# Patient Record
Sex: FEMALE | Race: WHITE | Employment: FULL TIME | URBAN - METROPOLITAN AREA
[De-identification: names, ages, dates, MRNs, and addresses within clinical notes are randomized per-mention and may not be internally consistent; named-entity substitution may affect disease eponyms.]

---

## 2017-10-06 ENCOUNTER — HOSPITAL ENCOUNTER (EMERGENCY)
Age: 37
Discharge: HOME | End: 2017-10-06
Attending: EMERGENCY MEDICINE
Payer: COMMERCIAL

## 2017-10-06 VITALS
WEIGHT: 160 LBS | SYSTOLIC BLOOD PRESSURE: 123 MMHG | BODY MASS INDEX: 28.35 KG/M2 | TEMPERATURE: 98.5 F | HEIGHT: 63 IN | RESPIRATION RATE: 18 BRPM | DIASTOLIC BLOOD PRESSURE: 78 MMHG | OXYGEN SATURATION: 100 % | HEART RATE: 111 BPM

## 2017-10-06 DIAGNOSIS — G43.811 OTHER MIGRAINE WITH STATUS MIGRAINOSUS, INTRACTABLE: Primary | ICD-10-CM

## 2017-10-06 LAB
ALBUMIN SERPL-MCNC: 3.8 G/DL (ref 3.5–5)
ALBUMIN/GLOB SERPL: 0.9 {RATIO} (ref 1.2–3.5)
ALP SERPL-CCNC: 67 U/L (ref 50–136)
ALT SERPL-CCNC: 36 U/L (ref 12–65)
ANION GAP SERPL CALC-SCNC: 19 MMOL/L (ref 7–16)
AST SERPL-CCNC: 36 U/L (ref 15–37)
ATRIAL RATE: 145 BPM
BASOPHILS # BLD: 0 K/UL (ref 0–0.2)
BASOPHILS NFR BLD: 0 % (ref 0–2)
BILIRUB SERPL-MCNC: 0.6 MG/DL (ref 0.2–1.1)
BUN SERPL-MCNC: 7 MG/DL (ref 6–23)
CALCIUM SERPL-MCNC: 7.7 MG/DL (ref 8.3–10.4)
CALCULATED P AXIS, ECG09: 64 DEGREES
CALCULATED R AXIS, ECG10: 77 DEGREES
CALCULATED T AXIS, ECG11: 47 DEGREES
CHLORIDE SERPL-SCNC: 96 MMOL/L (ref 98–107)
CO2 SERPL-SCNC: 19 MMOL/L (ref 21–32)
CREAT SERPL-MCNC: 0.91 MG/DL (ref 0.6–1)
DIAGNOSIS, 93000: NORMAL
DIFFERENTIAL METHOD BLD: ABNORMAL
EOSINOPHIL # BLD: 0 K/UL (ref 0–0.8)
EOSINOPHIL NFR BLD: 0 % (ref 0.5–7.8)
ERYTHROCYTE [DISTWIDTH] IN BLOOD BY AUTOMATED COUNT: 13.2 % (ref 11.9–14.6)
GLOBULIN SER CALC-MCNC: 4.1 G/DL (ref 2.3–3.5)
GLUCOSE SERPL-MCNC: 278 MG/DL (ref 65–100)
HCG UR QL: NEGATIVE
HCT VFR BLD AUTO: 42.5 % (ref 35.8–46.3)
HGB BLD-MCNC: 15.1 G/DL (ref 11.7–15.4)
IMM GRANULOCYTES # BLD: 0 K/UL (ref 0–0.5)
IMM GRANULOCYTES NFR BLD: 0.2 % (ref 0–5)
LIPASE SERPL-CCNC: 168 U/L (ref 73–393)
LYMPHOCYTES # BLD: 2.9 K/UL (ref 0.5–4.6)
LYMPHOCYTES NFR BLD: 21 % (ref 13–44)
MCH RBC QN AUTO: 33.3 PG (ref 26.1–32.9)
MCHC RBC AUTO-ENTMCNC: 35.5 G/DL (ref 31.4–35)
MCV RBC AUTO: 93.6 FL (ref 79.6–97.8)
MONOCYTES # BLD: 0.7 K/UL (ref 0.1–1.3)
MONOCYTES NFR BLD: 5 % (ref 4–12)
NEUTS SEG # BLD: 9.9 K/UL (ref 1.7–8.2)
NEUTS SEG NFR BLD: 74 % (ref 43–78)
P-R INTERVAL, ECG05: 112 MS
PLATELET # BLD AUTO: 304 K/UL (ref 150–450)
PMV BLD AUTO: 9.7 FL (ref 10.8–14.1)
POTASSIUM SERPL-SCNC: 3.2 MMOL/L (ref 3.5–5.1)
PROT SERPL-MCNC: 7.9 G/DL (ref 6.3–8.2)
Q-T INTERVAL, ECG07: 356 MS
QRS DURATION, ECG06: 86 MS
QTC CALCULATION (BEZET), ECG08: 552 MS
RBC # BLD AUTO: 4.54 M/UL (ref 4.05–5.25)
SODIUM SERPL-SCNC: 134 MMOL/L (ref 136–145)
TROPONIN I SERPL-MCNC: <0.02 NG/ML (ref 0.02–0.05)
VENTRICULAR RATE, ECG03: 145 BPM
WBC # BLD AUTO: 13.5 K/UL (ref 4.3–11.1)

## 2017-10-06 PROCEDURE — 74011000250 HC RX REV CODE- 250: Performed by: EMERGENCY MEDICINE

## 2017-10-06 PROCEDURE — 74011250636 HC RX REV CODE- 250/636: Performed by: EMERGENCY MEDICINE

## 2017-10-06 PROCEDURE — 93005 ELECTROCARDIOGRAM TRACING: CPT | Performed by: EMERGENCY MEDICINE

## 2017-10-06 PROCEDURE — 83690 ASSAY OF LIPASE: CPT | Performed by: EMERGENCY MEDICINE

## 2017-10-06 PROCEDURE — 81025 URINE PREGNANCY TEST: CPT

## 2017-10-06 PROCEDURE — 96375 TX/PRO/DX INJ NEW DRUG ADDON: CPT | Performed by: EMERGENCY MEDICINE

## 2017-10-06 PROCEDURE — 84484 ASSAY OF TROPONIN QUANT: CPT | Performed by: EMERGENCY MEDICINE

## 2017-10-06 PROCEDURE — 96365 THER/PROPH/DIAG IV INF INIT: CPT | Performed by: EMERGENCY MEDICINE

## 2017-10-06 PROCEDURE — 81003 URINALYSIS AUTO W/O SCOPE: CPT | Performed by: EMERGENCY MEDICINE

## 2017-10-06 PROCEDURE — 80053 COMPREHEN METABOLIC PANEL: CPT | Performed by: EMERGENCY MEDICINE

## 2017-10-06 PROCEDURE — 85025 COMPLETE CBC W/AUTO DIFF WBC: CPT | Performed by: EMERGENCY MEDICINE

## 2017-10-06 PROCEDURE — 99285 EMERGENCY DEPT VISIT HI MDM: CPT | Performed by: EMERGENCY MEDICINE

## 2017-10-06 RX ORDER — DEXAMETHASONE SODIUM PHOSPHATE 100 MG/10ML
10 INJECTION INTRAMUSCULAR; INTRAVENOUS
Status: COMPLETED | OUTPATIENT
Start: 2017-10-06 | End: 2017-10-06

## 2017-10-06 RX ORDER — MAGNESIUM SULFATE HEPTAHYDRATE 40 MG/ML
2 INJECTION, SOLUTION INTRAVENOUS
Status: COMPLETED | OUTPATIENT
Start: 2017-10-06 | End: 2017-10-06

## 2017-10-06 RX ORDER — PAROXETINE 10 MG/1
5 TABLET, FILM COATED ORAL DAILY
COMMUNITY
End: 2019-06-13

## 2017-10-06 RX ORDER — SUMATRIPTAN 50 MG/1
50 TABLET, FILM COATED ORAL
Qty: 10 TAB | Refills: 2 | Status: SHIPPED | OUTPATIENT
Start: 2017-10-06 | End: 2019-06-13

## 2017-10-06 RX ORDER — LORAZEPAM 2 MG/ML
0.5 INJECTION INTRAMUSCULAR
Status: COMPLETED | OUTPATIENT
Start: 2017-10-06 | End: 2017-10-06

## 2017-10-06 RX ORDER — KETOROLAC TROMETHAMINE 30 MG/ML
30 INJECTION, SOLUTION INTRAMUSCULAR; INTRAVENOUS
Status: COMPLETED | OUTPATIENT
Start: 2017-10-06 | End: 2017-10-06

## 2017-10-06 RX ORDER — BUPROPION HYDROCHLORIDE 100 MG/1
100 TABLET ORAL ONCE
COMMUNITY
End: 2019-06-13

## 2017-10-06 RX ORDER — METOCLOPRAMIDE HYDROCHLORIDE 5 MG/ML
10 INJECTION INTRAMUSCULAR; INTRAVENOUS
Status: COMPLETED | OUTPATIENT
Start: 2017-10-06 | End: 2017-10-06

## 2017-10-06 RX ADMIN — DEXAMETHASONE SODIUM PHOSPHATE 10 MG: 10 INJECTION INTRAMUSCULAR; INTRAVENOUS at 02:27

## 2017-10-06 RX ADMIN — LORAZEPAM 0.5 MG: 2 INJECTION INTRAMUSCULAR; INTRAVENOUS at 02:27

## 2017-10-06 RX ADMIN — SODIUM CHLORIDE 1000 ML: 900 INJECTION, SOLUTION INTRAVENOUS at 02:22

## 2017-10-06 RX ADMIN — SODIUM CHLORIDE 25 MG: 9 INJECTION INTRAMUSCULAR; INTRAVENOUS; SUBCUTANEOUS at 03:49

## 2017-10-06 RX ADMIN — MAGNESIUM SULFATE HEPTAHYDRATE 2 G: 40 INJECTION, SOLUTION INTRAVENOUS at 02:36

## 2017-10-06 RX ADMIN — KETOROLAC TROMETHAMINE 30 MG: 30 INJECTION, SOLUTION INTRAMUSCULAR at 03:49

## 2017-10-06 RX ADMIN — METOCLOPRAMIDE 10 MG: 5 INJECTION, SOLUTION INTRAMUSCULAR; INTRAVENOUS at 02:27

## 2017-10-06 NOTE — ED PROVIDER NOTES
HPI Comments: Patient states she has been having a headache since around 6 PM.  He was gradual in onset, bifrontal  In location and severe at times. Her pain is worse with  Bright lights or loud sound. She has a history of migraines with similar pain. She did not take any medicine for her symptoms, says that she gets a headache like this several times a year. Elements of this note were made using speech recognition software. As such, errors of speech recognition may occur. Patient is a 40 y.o. female presenting with migraines. The history is provided by the patient. Migraine    Pertinent negatives include no fever, no nausea and no vomiting. Past Medical History:   Diagnosis Date    Depression     Migraine        History reviewed. No pertinent surgical history. History reviewed. No pertinent family history. Social History     Social History    Marital status: SINGLE     Spouse name: N/A    Number of children: N/A    Years of education: N/A     Occupational History    Not on file. Social History Main Topics    Smoking status: Never Smoker    Smokeless tobacco: Never Used    Alcohol use Yes    Drug use: No    Sexual activity: Yes     Partners: Male     Birth control/ protection: Surgical     Other Topics Concern    Not on file     Social History Narrative    No narrative on file         ALLERGIES: Review of patient's allergies indicates no known allergies. Review of Systems   Constitutional: Negative for chills and fever. Gastrointestinal: Negative for nausea and vomiting. All other systems reviewed and are negative. Vitals:    10/06/17 0036 10/06/17 0115 10/06/17 0117   BP: 158/73 137/87    Pulse: (!) 147     Resp: 18     Temp: 98.1 °F (36.7 °C)     SpO2: 92%  96%   Weight: 72.6 kg (160 lb)     Height: 5' 3\" (1.6 m)              Physical Exam   Constitutional: She is oriented to person, place, and time. She appears well-developed and well-nourished.    HENT: Head: Normocephalic and atraumatic. Eyes: Conjunctivae are normal. Pupils are equal, round, and reactive to light. Nondilated funduscopic exam shows sharp disc margins and normal vasculature and right eye   Neck: Normal range of motion. Neck supple. Musculoskeletal: She exhibits no edema or tenderness. Neurological: She is alert and oriented to person, place, and time. Skin: Skin is warm and dry. Psychiatric: She has a normal mood and affect. Her behavior is normal.   Nursing note and vitals reviewed.        MDM  Number of Diagnoses or Management Options  Diagnosis management comments: Differential diagnosis: Migraine/tension/cluster headache, meningitis, subarachnoid hemorrhage  3:42 AM Patient states her pain is better butshe is now nauseated after drinking water  We'll give her Phenergan and Toradol and reevaluate       Amount and/or Complexity of Data Reviewed  Clinical lab tests: ordered and reviewed    Risk of Complications, Morbidity, and/or Mortality  Presenting problems: moderate  Diagnostic procedures: moderate  Management options: moderate    Patient Progress  Patient progress: improved    ED Course       Procedures

## 2017-10-06 NOTE — DISCHARGE INSTRUCTIONS
Migraine Headache: Care Instructions  Your Care Instructions  Migraines are painful, throbbing headaches that often start on one side of the head. They may cause nausea and vomiting and make you sensitive to light, sound, or smell. Without treatment, migraines can last from 4 hours to a few days. Medicines can help prevent migraines or stop them after they have started. Your doctor can help you find which ones work best for you. Follow-up care is a key part of your treatment and safety. Be sure to make and go to all appointments, and call your doctor if you are having problems. It's also a good idea to know your test results and keep a list of the medicines you take. How can you care for yourself at home? · Do not drive if you have taken a prescription pain medicine. · Rest in a quiet, dark room until your headache is gone. Close your eyes, and try to relax or go to sleep. Don't watch TV or read. · Put a cold, moist cloth or cold pack on the painful area for 10 to 20 minutes at a time. Put a thin cloth between the cold pack and your skin. · Use a warm, moist towel or a heating pad set on low to relax tight shoulder and neck muscles. · Have someone gently massage your neck and shoulders. · Take your medicines exactly as prescribed. Call your doctor if you think you are having a problem with your medicine. You will get more details on the specific medicines your doctor prescribes. · Be careful not to take pain medicine more often than the instructions allow. You could get worse or more frequent headaches when the medicine wears off. To prevent migraines  · Keep a headache diary so you can figure out what triggers your headaches. Avoiding triggers may help you prevent headaches. Record when each headache began, how long it lasted, and what the pain was like.  (Was it throbbing, aching, stabbing, or dull?) Write down any other symptoms you had with the headache, such as nausea, flashing lights or dark spots, or sensitivity to bright light or loud noise. Note if the headache occurred near your period. List anything that might have triggered the headache. Triggers may include certain foods (chocolate, cheese, wine) or odors, smoke, bright light, stress, or lack of sleep. · If your doctor has prescribed medicine for your migraines, take it as directed. You may have medicine that you take only when you get a migraine and medicine that you take all the time to help prevent migraines. ¨ If your doctor has prescribed medicine for when you get a headache, take it at the first sign of a migraine, unless your doctor has given you other instructions. ¨ If your doctor has prescribed medicine to prevent migraines, take it exactly as prescribed. Call your doctor if you think you are having a problem with your medicine. · Find healthy ways to deal with stress. Migraines are most common during or right after stressful times. Take time to relax before and after you do something that has caused a migraine in the past.  · Try to keep your muscles relaxed by keeping good posture. Check your jaw, face, neck, and shoulder muscles for tension. Try to relax them. When you sit at a desk, change positions often. And make sure to stretch for 30 seconds each hour. · Get plenty of sleep and exercise. · Eat meals on a regular schedule. Avoid foods and drinks that often trigger migraines. These include chocolate, alcohol (especially red wine and port), aspartame, monosodium glutamate (MSG), and some additives found in foods (such as hot dogs, mclain, cold cuts, aged cheeses, and pickled foods). · Limit caffeine. Don't drink too much coffee, tea, or soda. But don't quit caffeine suddenly. That can also give you migraines. · Do not smoke or allow others to smoke around you. If you need help quitting, talk to your doctor about stop-smoking programs and medicines. These can increase your chances of quitting for good.   · If you are taking birth control pills or hormone therapy, talk to your doctor about whether they are triggering your migraines. When should you call for help? Call 911 anytime you think you may need emergency care. For example, call if:  · You have signs of a stroke. These may include:  ¨ Sudden numbness, paralysis, or weakness in your face, arm, or leg, especially on only one side of your body. ¨ Sudden vision changes. ¨ Sudden trouble speaking. ¨ Sudden confusion or trouble understanding simple statements. ¨ Sudden problems with walking or balance. ¨ A sudden, severe headache that is different from past headaches. Call your doctor now or seek immediate medical care if:  · You have new or worse nausea and vomiting. · You have a new or higher fever. · Your headache gets much worse. Watch closely for changes in your health, and be sure to contact your doctor if:  · You are not getting better after 2 days (48 hours). Where can you learn more? Go to http://prabhjot-helio.info/. Enter V287 in the search box to learn more about \"Migraine Headache: Care Instructions. \"  Current as of: October 14, 2016  Content Version: 11.3  © 7120-2143 [employer]. Care instructions adapted under license by [employer] (which disclaims liability or warranty for this information). If you have questions about a medical condition or this instruction, always ask your healthcare professional. [address] any warranty or liability for your use of this information.

## 2017-10-06 NOTE — ED NOTES
I have reviewed discharge instructions with the patient. The patient verbalized understanding. Patient left ED via Discharge Method: ambulatory to Home with family member. Opportunity for questions and clarification provided. Patient given 1 scripts.

## 2019-05-31 ENCOUNTER — APPOINTMENT (OUTPATIENT)
Dept: GENERAL RADIOLOGY | Age: 39
End: 2019-05-31
Attending: EMERGENCY MEDICINE
Payer: COMMERCIAL

## 2019-05-31 LAB
ALBUMIN SERPL-MCNC: 3.9 G/DL (ref 3.5–5)
ALBUMIN/GLOB SERPL: 1.1 {RATIO} (ref 1.2–3.5)
ALP SERPL-CCNC: 89 U/L (ref 50–136)
ALT SERPL-CCNC: 64 U/L (ref 12–65)
ANION GAP SERPL CALC-SCNC: 12 MMOL/L (ref 7–16)
AST SERPL-CCNC: 68 U/L (ref 15–37)
BASOPHILS # BLD: 0.1 K/UL (ref 0–0.2)
BASOPHILS NFR BLD: 1 % (ref 0–2)
BILIRUB SERPL-MCNC: 0.6 MG/DL (ref 0.2–1.1)
BUN SERPL-MCNC: 17 MG/DL (ref 6–23)
CALCIUM SERPL-MCNC: 9.1 MG/DL (ref 8.3–10.4)
CHLORIDE SERPL-SCNC: 98 MMOL/L (ref 98–107)
CO2 SERPL-SCNC: 25 MMOL/L (ref 21–32)
CREAT SERPL-MCNC: 1.15 MG/DL (ref 0.6–1)
DIFFERENTIAL METHOD BLD: ABNORMAL
EOSINOPHIL # BLD: 0.1 K/UL (ref 0–0.8)
EOSINOPHIL NFR BLD: 1 % (ref 0.5–7.8)
ERYTHROCYTE [DISTWIDTH] IN BLOOD BY AUTOMATED COUNT: 13.3 % (ref 11.9–14.6)
GLOBULIN SER CALC-MCNC: 3.4 G/DL (ref 2.3–3.5)
GLUCOSE SERPL-MCNC: 137 MG/DL (ref 65–100)
HCT VFR BLD AUTO: 39.3 % (ref 35.8–46.3)
HGB BLD-MCNC: 14 G/DL (ref 11.7–15.4)
IMM GRANULOCYTES # BLD AUTO: 0.1 K/UL (ref 0–0.5)
IMM GRANULOCYTES NFR BLD AUTO: 1 % (ref 0–5)
LYMPHOCYTES # BLD: 2.8 K/UL (ref 0.5–4.6)
LYMPHOCYTES NFR BLD: 37 % (ref 13–44)
MCH RBC QN AUTO: 34.1 PG (ref 26.1–32.9)
MCHC RBC AUTO-ENTMCNC: 35.6 G/DL (ref 31.4–35)
MCV RBC AUTO: 95.9 FL (ref 79.6–97.8)
MONOCYTES # BLD: 0.4 K/UL (ref 0.1–1.3)
MONOCYTES NFR BLD: 5 % (ref 4–12)
NEUTS SEG # BLD: 4.3 K/UL (ref 1.7–8.2)
NEUTS SEG NFR BLD: 56 % (ref 43–78)
NRBC # BLD: 0 K/UL (ref 0–0.2)
PLATELET # BLD AUTO: 138 K/UL (ref 150–450)
PMV BLD AUTO: 10.8 FL (ref 9.4–12.3)
POTASSIUM SERPL-SCNC: 2.8 MMOL/L (ref 3.5–5.1)
PROT SERPL-MCNC: 7.3 G/DL (ref 6.3–8.2)
RBC # BLD AUTO: 4.1 M/UL (ref 4.05–5.2)
SODIUM SERPL-SCNC: 135 MMOL/L (ref 136–145)
TROPONIN I SERPL-MCNC: <0.02 NG/ML (ref 0.02–0.05)
WBC # BLD AUTO: 7.7 K/UL (ref 4.3–11.1)

## 2019-05-31 PROCEDURE — 85025 COMPLETE CBC W/AUTO DIFF WBC: CPT

## 2019-05-31 PROCEDURE — 71046 X-RAY EXAM CHEST 2 VIEWS: CPT

## 2019-05-31 PROCEDURE — 93005 ELECTROCARDIOGRAM TRACING: CPT | Performed by: EMERGENCY MEDICINE

## 2019-05-31 PROCEDURE — 99285 EMERGENCY DEPT VISIT HI MDM: CPT

## 2019-05-31 PROCEDURE — 80053 COMPREHEN METABOLIC PANEL: CPT

## 2019-05-31 PROCEDURE — 93005 ELECTROCARDIOGRAM TRACING: CPT

## 2019-05-31 PROCEDURE — 84484 ASSAY OF TROPONIN QUANT: CPT

## 2019-06-01 ENCOUNTER — HOSPITAL ENCOUNTER (EMERGENCY)
Age: 39
Discharge: HOME | End: 2019-06-01
Payer: COMMERCIAL

## 2019-06-01 VITALS
OXYGEN SATURATION: 94 % | BODY MASS INDEX: 33.66 KG/M2 | DIASTOLIC BLOOD PRESSURE: 72 MMHG | SYSTOLIC BLOOD PRESSURE: 116 MMHG | HEIGHT: 63 IN | TEMPERATURE: 98 F | RESPIRATION RATE: 16 BRPM | WEIGHT: 190 LBS | HEART RATE: 95 BPM

## 2019-06-01 DIAGNOSIS — R00.0 TACHYCARDIA: ICD-10-CM

## 2019-06-01 DIAGNOSIS — R07.89 ATYPICAL CHEST PAIN: Primary | ICD-10-CM

## 2019-06-01 LAB
AMPHET UR QL SCN: NEGATIVE
ATRIAL RATE: 122 BPM
BARBITURATES UR QL SCN: NEGATIVE
BENZODIAZ UR QL: POSITIVE
CALCULATED R AXIS, ECG10: 35 DEGREES
CALCULATED T AXIS, ECG11: 28 DEGREES
CANNABINOIDS UR QL SCN: NEGATIVE
COCAINE UR QL SCN: NEGATIVE
D DIMER PPP FEU-MCNC: 0.36 UG/ML(FEU)
DIAGNOSIS, 93000: NORMAL
METHADONE UR QL: NEGATIVE
OPIATES UR QL: NEGATIVE
P-R INTERVAL, ECG05: 104 MS
PCP UR QL: NEGATIVE
Q-T INTERVAL, ECG07: 434 MS
QRS DURATION, ECG06: 90 MS
QTC CALCULATION (BEZET), ECG08: 618 MS
TROPONIN I BLD-MCNC: 0.02 NG/ML (ref 0.02–0.05)
TROPONIN I BLD-MCNC: 0.04 NG/ML (ref 0.02–0.05)
VENTRICULAR RATE, ECG03: 122 BPM

## 2019-06-01 PROCEDURE — 74011250636 HC RX REV CODE- 250/636

## 2019-06-01 PROCEDURE — 80307 DRUG TEST PRSMV CHEM ANLYZR: CPT

## 2019-06-01 PROCEDURE — 84484 ASSAY OF TROPONIN QUANT: CPT

## 2019-06-01 PROCEDURE — 85379 FIBRIN DEGRADATION QUANT: CPT

## 2019-06-01 PROCEDURE — 96365 THER/PROPH/DIAG IV INF INIT: CPT

## 2019-06-01 PROCEDURE — 96366 THER/PROPH/DIAG IV INF ADDON: CPT

## 2019-06-01 RX ORDER — POTASSIUM CHLORIDE 14.9 MG/ML
20 INJECTION INTRAVENOUS
Status: COMPLETED | OUTPATIENT
Start: 2019-06-01 | End: 2019-06-01

## 2019-06-01 RX ORDER — SODIUM CHLORIDE 9 MG/ML
1000 INJECTION, SOLUTION INTRAVENOUS ONCE
Status: COMPLETED | OUTPATIENT
Start: 2019-06-01 | End: 2019-06-01

## 2019-06-01 RX ORDER — POTASSIUM CHLORIDE 14.9 MG/ML
INJECTION INTRAVENOUS
Status: DISCONTINUED
Start: 2019-06-01 | End: 2019-06-01 | Stop reason: HOSPADM

## 2019-06-01 RX ADMIN — POTASSIUM CHLORIDE 20 MEQ: 200 INJECTION, SOLUTION INTRAVENOUS at 01:30

## 2019-06-01 RX ADMIN — SODIUM CHLORIDE 1000 ML: 900 INJECTION, SOLUTION INTRAVENOUS at 02:20

## 2019-06-01 RX ADMIN — SODIUM CHLORIDE 1000 ML: 900 INJECTION, SOLUTION INTRAVENOUS at 01:30

## 2019-06-01 NOTE — ED PROVIDER NOTES
28-year-old female complaining of chest tightness onset over one week. Change. Patient was seen in urgent care week ago questionable what tests were run she was told to go. An EKG somewhere. Tonight she felt like she might pass out and came to the ER. [place] Daniel Patient's only documented medical disorder is bipolar and migraines. She is on Imitrex and Paxil. The history is provided by the patient. Chest Pain (Angina)    This is a new problem. The current episode started more than 1 week ago. The problem has not changed since onset. The problem occurs constantly. The pain is associated with normal activity. The pain is present in the substernal region. The pain is at a severity of 6/10. The pain is moderate. The quality of the pain is described as tightness. The pain does not radiate. Associated symptoms include malaise/fatigue and shortness of breath. Pertinent negatives include no diaphoresis. She has tried nothing for the symptoms. Risk factors include obesity and a sendentary lifestyle. Past Medical History:   Diagnosis Date    Depression     Migraine        No past surgical history on file. No family history on file. Social History     Socioeconomic History    Marital status: SINGLE     Spouse name: Not on file    Number of children: Not on file    Years of education: Not on file    Highest education level: Not on file   Occupational History    Not on file   Social Needs    Financial resource strain: Not on file    Food insecurity:     Worry: Not on file     Inability: Not on file    Transportation needs:     Medical: Not on file     Non-medical: Not on file   Tobacco Use    Smoking status: Never Smoker    Smokeless tobacco: Never Used   Substance and Sexual Activity    Alcohol use:  Yes    Drug use: No    Sexual activity: Yes     Partners: Male     Birth control/protection: Surgical   Lifestyle    Physical activity:     Days per week: Not on file     Minutes per session: Not on file  Stress: Not on file   Relationships    Social connections:     Talks on phone: Not on file     Gets together: Not on file     Attends [employer] service: Not on file     Active member of club or organization: Not on file     Attends meetings of clubs or organizations: Not on file     Relationship status: Not on file    Intimate partner violence:     Fear of current or ex partner: Not on file     Emotionally abused: Not on file     Physically abused: Not on file     Forced sexual activity: Not on file   Other Topics Concern    Not on file   Social History Narrative    Not on file         ALLERGIES: Patient has no known allergies. Review of Systems   Constitutional: Positive for malaise/fatigue. Negative for activity change and diaphoresis. HENT: Negative. Eyes: Negative. Respiratory: Positive for shortness of breath. Cardiovascular: Positive for chest pain. Gastrointestinal: Negative. Genitourinary: Negative. Musculoskeletal: Negative. Skin: Negative. Neurological: Negative. Psychiatric/Behavioral: Negative. All other systems reviewed and are negative. Vitals:    05/31/19 2138   BP: 120/81   Pulse: (!) 127   Resp: 20   Temp: 98 °F (36.7 °C)   SpO2: 94%   Weight: 86.2 kg (190 lb)   Height: 5' 3\" (1.6 m)            Physical Exam   Constitutional: She is oriented to person, place, and time. She appears well-developed and well-nourished. No distress. HENT:   Head: Normocephalic and atraumatic. Right Ear: External ear normal.   Left Ear: External ear normal.   Nose: Nose normal.   Mouth/Throat: Oropharynx is clear and moist. No oropharyngeal exudate. Eyes: Pupils are equal, round, and reactive to light. Conjunctivae and EOM are normal. Right eye exhibits no discharge. Left eye exhibits no discharge. No scleral icterus. Neck: Normal range of motion. Neck supple. No JVD present. No tracheal deviation present.    Cardiovascular: Normal rate, regular rhythm and intact distal pulses. Pulmonary/Chest: Effort normal and breath sounds normal. No stridor. No respiratory distress. She has no wheezes. She exhibits no tenderness. Abdominal: Soft. Bowel sounds are normal. She exhibits no distension and no mass. There is no tenderness. Musculoskeletal: Normal range of motion. She exhibits no edema or tenderness. Neurological: She is alert and oriented to person, place, and time. No cranial nerve deficit. Skin: Skin is warm and dry. No rash noted. She is not diaphoretic. No erythema. No pallor. Psychiatric: She has a normal mood and affect. Her behavior is normal. Thought content normal.   Nursing note and vitals reviewed. MDM  Number of Diagnoses or Management Options  Atypical chest pain: new and requires workup  Tachycardia: new and requires workup  Diagnosis management comments: Assessment atypical chest pain normal troponin normal D dimer normal EKG. [facility]        Amount and/or Complexity of Data Reviewed  Clinical lab tests: ordered and reviewed  Tests in the radiology section of CPT®: ordered and reviewed  Tests in the medicine section of CPT®: ordered and reviewed    Risk of Complications, Morbidity, and/or Mortality  Presenting problems: high  Diagnostic procedures: high  Management options: high    Patient Progress  Patient progress: stable         Procedures

## 2019-06-01 NOTE — ED NOTES
I have reviewed discharge instructions with the patient. The patient verbalized understanding. Patient left ED via Discharge Method: ambulatory to Home with fiance. Opportunity for questions and clarification provided. Patient given 0 scripts. To continue your aftercare when you leave the hospital, you may receive an automated call from our care team to check in on how you are doing. This is a free service and part of our promise to provide the best care and service to meet your aftercare needs.  If you have questions, or wish to unsubscribe from this service please call [phone]. Thank you for Choosing our [facility].

## 2019-06-01 NOTE — ED TRIAGE NOTES
C/o substernal non-radiating chest pain. Describes as \"dull\". Reports associated shortness of breath. States intermittent over last 2 weeks. Reports blurred vision, onset today while sitting at computer. States constant since onset approx 1500. Denies head pain. Denies n/v/d. Denies cough. Denies fever or chills. Denies urinary symptoms. Denies diaphoresis. Denies hx of same. States seen at urgent care 5/29 for note to return to work. Reports cold symptoms, n/v/d at that time. States heart rate noted in 160s at urgent care, instructed to come to ed however went home after discharge from urgent care.

## 2019-06-01 NOTE — DISCHARGE INSTRUCTIONS

## 2023-04-10 ENCOUNTER — HOSPITAL ENCOUNTER (EMERGENCY)
Age: 43
Discharge: PSYCHIATRIC OR SUBSTANCE USE FACILITY | End: 2023-04-11
Attending: EMERGENCY MEDICINE
Payer: COMMERCIAL

## 2023-04-10 DIAGNOSIS — R44.0 SEVERE AUDITORY HALLUCINATIONS: ICD-10-CM

## 2023-04-10 DIAGNOSIS — F30.9 MANIC EPISODE (HCC): Primary | ICD-10-CM

## 2023-04-10 DIAGNOSIS — R44.1 CONTINUOUS VISUAL HALLUCINATIONS: ICD-10-CM

## 2023-04-10 DIAGNOSIS — F23 BRIEF PSYCHOTIC DISORDER (HCC): ICD-10-CM

## 2023-04-10 DIAGNOSIS — R45.850 HOMICIDAL IDEATIONS: ICD-10-CM

## 2023-04-10 PROBLEM — R45.1 AGITATION: Status: ACTIVE | Noted: 2023-04-10

## 2023-04-10 PROBLEM — F30.10 MANIC BEHAVIOR (HCC): Status: ACTIVE | Noted: 2023-04-10

## 2023-04-10 PROBLEM — G47.9 SLEEP DISTURBANCES: Status: ACTIVE | Noted: 2023-04-10

## 2023-04-10 PROBLEM — F29 PSYCHOSIS (HCC): Status: ACTIVE | Noted: 2023-04-10

## 2023-04-10 LAB
ALBUMIN SERPL-MCNC: 4.7 G/DL (ref 3.5–5)
ALBUMIN/GLOB SERPL: 1.5 (ref 0.4–1.6)
ALP SERPL-CCNC: 68 U/L (ref 50–130)
ALT SERPL-CCNC: 27 U/L (ref 12–65)
AMPHET UR QL SCN: NEGATIVE
ANION GAP SERPL CALC-SCNC: 7 MMOL/L (ref 2–11)
APAP SERPL-MCNC: <2 UG/ML (ref 10–30)
AST SERPL-CCNC: 30 U/L (ref 15–37)
BARBITURATES UR QL SCN: NEGATIVE
BASOPHILS # BLD: 0 K/UL (ref 0–0.2)
BASOPHILS NFR BLD: 0 % (ref 0–2)
BENZODIAZ UR QL: NEGATIVE
BILIRUB SERPL-MCNC: 0.7 MG/DL (ref 0.2–1.1)
BUN SERPL-MCNC: 6 MG/DL (ref 6–23)
CALCIUM SERPL-MCNC: 9.2 MG/DL (ref 8.3–10.4)
CANNABINOIDS UR QL SCN: POSITIVE
CHLORIDE SERPL-SCNC: 106 MMOL/L (ref 101–110)
CO2 SERPL-SCNC: 23 MMOL/L (ref 21–32)
COCAINE UR QL SCN: NEGATIVE
CREAT SERPL-MCNC: 0.88 MG/DL (ref 0.6–1)
DIFFERENTIAL METHOD BLD: ABNORMAL
EOSINOPHIL # BLD: 0 K/UL (ref 0–0.8)
EOSINOPHIL NFR BLD: 0 % (ref 0.5–7.8)
ERYTHROCYTE [DISTWIDTH] IN BLOOD BY AUTOMATED COUNT: 13.1 % (ref 11.9–14.6)
ETHANOL SERPL-MCNC: <3 MG/DL (ref 0–0.08)
FLUAV RNA SPEC QL NAA+PROBE: NOT DETECTED
FLUBV RNA SPEC QL NAA+PROBE: NOT DETECTED
GLOBULIN SER CALC-MCNC: 3.1 G/DL (ref 2.8–4.5)
GLUCOSE SERPL-MCNC: 136 MG/DL (ref 65–100)
HCT VFR BLD AUTO: 43.4 % (ref 35.8–46.3)
HGB BLD-MCNC: 15.2 G/DL (ref 11.7–15.4)
IMM GRANULOCYTES # BLD AUTO: 0 K/UL (ref 0–0.5)
IMM GRANULOCYTES NFR BLD AUTO: 0 % (ref 0–5)
LYMPHOCYTES # BLD: 1.9 K/UL (ref 0.5–4.6)
LYMPHOCYTES NFR BLD: 15 % (ref 13–44)
MCH RBC QN AUTO: 31.8 PG (ref 26.1–32.9)
MCHC RBC AUTO-ENTMCNC: 35 G/DL (ref 31.4–35)
MCV RBC AUTO: 90.8 FL (ref 82–102)
METHADONE UR QL: NEGATIVE
MONOCYTES # BLD: 0.8 K/UL (ref 0.1–1.3)
MONOCYTES NFR BLD: 7 % (ref 4–12)
NEUTS SEG # BLD: 10 K/UL (ref 1.7–8.2)
NEUTS SEG NFR BLD: 78 % (ref 43–78)
NRBC # BLD: 0 K/UL (ref 0–0.2)
OPIATES UR QL: NEGATIVE
PCP UR QL: NEGATIVE
PHENOBARB SERPL-MCNC: 2.3 UG/ML (ref 15–40)
PLATELET # BLD AUTO: 345 K/UL (ref 150–450)
PMV BLD AUTO: 9.4 FL (ref 9.4–12.3)
POTASSIUM SERPL-SCNC: 3.2 MMOL/L (ref 3.5–5.1)
PROT SERPL-MCNC: 7.8 G/DL (ref 6.3–8.2)
RBC # BLD AUTO: 4.78 M/UL (ref 4.05–5.2)
SALICYLATES SERPL-MCNC: <1.7 MG/DL (ref 2.8–20)
SARS-COV-2 RDRP RESP QL NAA+PROBE: NOT DETECTED
SODIUM SERPL-SCNC: 136 MMOL/L (ref 133–143)
SOURCE: NORMAL
TROPONIN I SERPL HS-MCNC: 4.5 PG/ML (ref 0–14)
WBC # BLD AUTO: 12.7 K/UL (ref 4.3–11.1)

## 2023-04-10 PROCEDURE — 80143 DRUG ASSAY ACETAMINOPHEN: CPT

## 2023-04-10 PROCEDURE — 80053 COMPREHEN METABOLIC PANEL: CPT

## 2023-04-10 PROCEDURE — 80307 DRUG TEST PRSMV CHEM ANLYZR: CPT

## 2023-04-10 PROCEDURE — 96375 TX/PRO/DX INJ NEW DRUG ADDON: CPT

## 2023-04-10 PROCEDURE — 82077 ASSAY SPEC XCP UR&BREATH IA: CPT

## 2023-04-10 PROCEDURE — 87635 SARS-COV-2 COVID-19 AMP PRB: CPT

## 2023-04-10 PROCEDURE — 93005 ELECTROCARDIOGRAM TRACING: CPT

## 2023-04-10 PROCEDURE — 96374 THER/PROPH/DIAG INJ IV PUSH: CPT

## 2023-04-10 PROCEDURE — 6360000002 HC RX W HCPCS: Performed by: EMERGENCY MEDICINE

## 2023-04-10 PROCEDURE — 90792 PSYCH DIAG EVAL W/MED SRVCS: CPT | Performed by: NURSE PRACTITIONER

## 2023-04-10 PROCEDURE — 6370000000 HC RX 637 (ALT 250 FOR IP)

## 2023-04-10 PROCEDURE — 85025 COMPLETE CBC W/AUTO DIFF WBC: CPT

## 2023-04-10 PROCEDURE — 6360000002 HC RX W HCPCS

## 2023-04-10 PROCEDURE — 99285 EMERGENCY DEPT VISIT HI MDM: CPT

## 2023-04-10 PROCEDURE — 2580000003 HC RX 258

## 2023-04-10 PROCEDURE — 80184 ASSAY OF PHENOBARBITAL: CPT

## 2023-04-10 PROCEDURE — 96372 THER/PROPH/DIAG INJ SC/IM: CPT

## 2023-04-10 PROCEDURE — 87502 INFLUENZA DNA AMP PROBE: CPT

## 2023-04-10 PROCEDURE — 80179 DRUG ASSAY SALICYLATE: CPT

## 2023-04-10 PROCEDURE — 84484 ASSAY OF TROPONIN QUANT: CPT

## 2023-04-10 RX ORDER — HALOPERIDOL 5 MG/ML
5 INJECTION INTRAMUSCULAR ONCE
Status: COMPLETED | OUTPATIENT
Start: 2023-04-10 | End: 2023-04-10

## 2023-04-10 RX ORDER — LORAZEPAM 2 MG/ML
1 INJECTION INTRAMUSCULAR ONCE
Status: DISCONTINUED | OUTPATIENT
Start: 2023-04-10 | End: 2023-04-10

## 2023-04-10 RX ORDER — HYDROXYZINE HYDROCHLORIDE 25 MG/1
25 TABLET, FILM COATED ORAL NIGHTLY
COMMUNITY

## 2023-04-10 RX ORDER — QUETIAPINE FUMARATE 400 MG/1
800 TABLET, FILM COATED ORAL NIGHTLY
COMMUNITY
Start: 2023-04-10

## 2023-04-10 RX ORDER — LORAZEPAM 2 MG/ML
1 INJECTION INTRAMUSCULAR EVERY 4 HOURS PRN
Status: DISCONTINUED | OUTPATIENT
Start: 2023-04-10 | End: 2023-04-11 | Stop reason: HOSPADM

## 2023-04-10 RX ORDER — BUSPIRONE HYDROCHLORIDE 15 MG/1
15 TABLET ORAL 2 TIMES DAILY
COMMUNITY

## 2023-04-10 RX ORDER — DIPHENHYDRAMINE HYDROCHLORIDE 50 MG/ML
25 INJECTION INTRAMUSCULAR; INTRAVENOUS
Status: COMPLETED | OUTPATIENT
Start: 2023-04-10 | End: 2023-04-10

## 2023-04-10 RX ORDER — QUETIAPINE FUMARATE 100 MG/1
400 TABLET, FILM COATED ORAL NIGHTLY
Status: DISCONTINUED | OUTPATIENT
Start: 2023-04-10 | End: 2023-04-11 | Stop reason: HOSPADM

## 2023-04-10 RX ORDER — BUSPIRONE HYDROCHLORIDE 5 MG/1
15 TABLET ORAL 2 TIMES DAILY
Status: DISCONTINUED | OUTPATIENT
Start: 2023-04-10 | End: 2023-04-11 | Stop reason: HOSPADM

## 2023-04-10 RX ORDER — LORAZEPAM 2 MG/ML
1 INJECTION INTRAMUSCULAR ONCE
Status: COMPLETED | OUTPATIENT
Start: 2023-04-10 | End: 2023-04-10

## 2023-04-10 RX ORDER — LORAZEPAM 2 MG/ML
1 INJECTION INTRAMUSCULAR EVERY 4 HOURS PRN
Status: DISCONTINUED | OUTPATIENT
Start: 2023-04-10 | End: 2023-04-10

## 2023-04-10 RX ORDER — 0.9 % SODIUM CHLORIDE 0.9 %
1000 INTRAVENOUS SOLUTION INTRAVENOUS
Status: COMPLETED | OUTPATIENT
Start: 2023-04-10 | End: 2023-04-10

## 2023-04-10 RX ORDER — HYDROXYZINE HYDROCHLORIDE 25 MG/1
25 TABLET, FILM COATED ORAL
Status: DISCONTINUED | OUTPATIENT
Start: 2023-04-10 | End: 2023-04-11 | Stop reason: HOSPADM

## 2023-04-10 RX ORDER — ESZOPICLONE 3 MG/1
3 TABLET, FILM COATED ORAL DAILY PRN
COMMUNITY
Start: 2023-03-14

## 2023-04-10 RX ADMIN — HALOPERIDOL LACTATE 5 MG: 5 INJECTION, SOLUTION INTRAMUSCULAR at 11:44

## 2023-04-10 RX ADMIN — LORAZEPAM 1 MG: 2 INJECTION INTRAMUSCULAR; INTRAVENOUS at 17:31

## 2023-04-10 RX ADMIN — QUETIAPINE FUMARATE 400 MG: 100 TABLET ORAL at 21:58

## 2023-04-10 RX ADMIN — SODIUM CHLORIDE 1000 ML: 9 INJECTION, SOLUTION INTRAVENOUS at 16:20

## 2023-04-10 RX ADMIN — HALOPERIDOL LACTATE 5 MG: 5 INJECTION, SOLUTION INTRAMUSCULAR at 10:59

## 2023-04-10 RX ADMIN — HYDROXYZINE HYDROCHLORIDE 25 MG: 25 TABLET, FILM COATED ORAL at 21:58

## 2023-04-10 RX ADMIN — LORAZEPAM 1 MG: 2 INJECTION INTRAMUSCULAR; INTRAVENOUS at 10:59

## 2023-04-10 RX ADMIN — BUSPIRONE HYDROCHLORIDE 15 MG: 5 TABLET ORAL at 21:58

## 2023-04-10 RX ADMIN — DIPHENHYDRAMINE HYDROCHLORIDE 25 MG: 50 INJECTION, SOLUTION INTRAMUSCULAR; INTRAVENOUS at 10:59

## 2023-04-10 NOTE — BH NOTE
Content/Perceptual Disturbances Denies current or active suicidal/homicidal ideation / denies current or active auditory/visual hallucinations - pt does not appear to be responding to any internal stimuli at this time  Cognition/Sensorium  Alert / intermittent disorientation / decreased concentration  Insight  impaired  Judgment impaired      Unable to complete PHQ or JULIET r/t pt condition      ASSESSMENT  Psychiatric Diagnoses:  Manic behavior ([facility] [address] [F30.10 (ICD-10-CM)], Sleep disturbances [G47.9 (ICD-10-CM)], Agitation [R45.1 (ICD-10-CM)], Psychosis, unspecified psychosis type (HCC) [F29 (ICD-10-CM)]      Plan:  38 yo F with hx of depression, anxiety, cannabis abuse, bipolar, MDD with psychotic features, insomnia / pt presented to the ED today with manic behavior, agitation, no sleep for 2-3 days - minimal eating or drinking for a few days  / possible SI/HI/AVH - pt denies SI/HI at this time - no intent or plan / pt denies AVH at this time and does not appear to be responding to any internal stimuli / + THC on UDS  - Pt disorganized, intermittent disorientation and confusion, with concentration difficulties, rapid/pressured speech  - Per , pt has not had her medication in a couple of days - unclear why  - Per  and chart review - similar presentation in 2018 (after attempting rigorous dieting for weight loss) with subsequent inpatient psychiatric admission  - Pt currently MEETS criteria for IVC - and can transfer to inpatient psychiatry facility once medically cleared  - Suicide/Safety Precautions    - Can consider restarting home medications -        - Seroquel 400 mg (2 tablets at bedtime) - to target unstable mood, cognitive symptoms, positive symptoms of psychosis, insomnia, anxiety - monitor for dizziness, sedation, dry mouth, constipation, abdominal pain,                  tachycardia, orthostatic hypotension, risk of TD, hyperglycemia, rare NMS, rare seizures, weight gain       - Lunesta 3 mg po

## 2023-04-10 NOTE — ED NOTES
Pt is calm and cooperative since taking her out of bilateral wrist restraints. Terrell MATHIS notified.       Preethi Olson RN  04/10/23 9049

## 2023-04-10 NOTE — CARE COORDINATION
SW met with patient and her  Lawerance Bernheim at the bedside. Demographic information confirmed. Patient reports that she sees Justice Hernandez for primary care and is current with Dr. Jennifer Carpio for psychiatry. Patient has a hx of ETOH abuse but has not had alcohol in years. Patient's last \"mental breakdown\" was four or five years ago. Patient endorses a mental health hx but states that she does not know what her diagnoses are. Patient reports that she has not slept or eaten in three to four days. Patient to be evaluated by psychiatry, reports to SW that she's feeling much better. Update: Recommendation for IVC, papers prepared and notarized. Referrals sent to [facility], [address] [place] [address], and and [facility]. Update: Call to [address] [place] [address], no beds available today but there may be one in the morning. MANUEL spoke with [facility] who advises that they may be able to accept the patient once she's been out of restraints for 6 hours. Serena requests SW send updated notes in the am and an update on restraint status.      Debbie Champagne, WONG    [address] [facility]

## 2023-04-10 NOTE — ED TRIAGE NOTES
Pt with manic behavior, agitation last 2-3 days. Pt not sleeping. Per family patient has been having Suicidal ideation. Concern for hallucination. Pt sates \"I see hooker to the left. Edilma Thuan to the right. \" \"I see a black jeep. \" Pt family reports previous issue with mental health requiring hospitalization. Only known medical issues depression, anxiety and migraines. Pt acting out during triage yelling and repeating the same nonsensical phrases. Pt off all prescribed medication x 3 days. Pt  states she goes on these extreme diets and then starts to not act right. \"That is what happened last time. \"

## 2023-04-10 NOTE — ED PROVIDER NOTES
Emergency Department Provider Note       PCP: Silvana Ceja, APRN - NP   Age: 37 y.o. Sex: female     Amilcar Lira 16 04/10/2023 02:51:31 PM       ICD-10-CM    1. Manic episode ([address]  F30.9       2. Severe auditory hallucinations  R44.0       3. Continuous visual hallucinations  R44.1       4. Homicidal ideations  R45.850       5. Brief psychotic disorder [facility])  F23           Medical Decision Making     Complexity of Problems Addressed:  1 or more acute illnesses that pose a threat to life or bodily function. Data Reviewed and Analyzed:  Category 1:   I independently ordered and reviewed each unique test.  I reviewed external records: ED visit note from an outside group. Category 2:   I independently ordered and interpreted the ED EKG in the absence of a Cardiologist.    Rate: 117  EKG Interpretation: EKG Interpretation: sinus rhythm, no evidence of arrhythmia  ST Segments: Normal ST segments - NO STEMI  I independently interpreted the cardiac monitor rhythm strip sinus tachycardia 117 bpm.  I interpreted the labs. Category 3: Discussion of management or test interpretation. This patient presented to the emergency department with continuous auditory and visual hallucinations along with suicidal and homicidal ideations. She exhibiting tangential speech throughout the entirety of the examination and unable to answer any my questions throughout obtaining the history. Telepsych consult initiated. Ativan, Haldol, and Benadryl given throughout ED course and patient placed in four-point restraints for safety of self and others. Hubert Jimenes NP consulted who has physically evaluated this patient in the ED. She believes this patient would benefit from a behavioral health hold. Patient had a similar presentation in 2018 after attempting rigorous dieting for weight loss with subsequent inpatient psychiatric admission.   She currently meets criteria for IVC and can then

## 2023-04-10 NOTE — ED NOTES
Constant Observer Yes - Name: sitter   Constant Observer Oriented yes   High risk patients are in line of sight at all times Yes   Excess equipment/medical supplies not necessary for the care of the patient removed Yes   All sharp or dangerous objects are removed from room: including but not limited to belts, pens & pencils, needles, medications, cosmetics, lighters, matches, nail files, watches, necklaces, glass objects, razors, razor blades, knives, aerosol sprays, drawstring pants, shoes, cords (telephone, call bells, etc.) cleaning wipes or other cleaning items, aluminum cans, not permanently attached wall décor Yes   Telephone/cell phone removed as well as TV remote (batteries can be swallowed) Yes   Patient belongings removed and labeled at nurses station Yes   Excess linen is removed from room Yes   All plastic bags are removed from the room and replaced with paper trash bags Yes   Patient is in paper scrubs or appropriate gown and using hospital socks with rubber soles Yes   No metal, hard eating utensils or hard plates are on meal tray Yes   Remove all cleaning agents used by [employer] Yes   If Crucifix is hanging on a nail, remove Crucifix as well as the nail Yes       *If any question above is answered \"No,\" documentation is required.        Selestine Prader, RN  04/10/23 6192

## 2023-04-10 NOTE — ED NOTES
This RN took pt out of bilateral wrist restraints in order to test pt's cooperativeness. Will continue to monitor patients condition and behavior.       Erin Horton RN  04/10/23 5168

## 2023-04-10 NOTE — ED NOTES
Pt yelling out of room. Jerson RN went to assess pt. Pt requested water. This RN gave small amount to pt,  assisted pt to drink water. Pt began coughing after one sip. This RN repositioned pt to higher sitting position. Pt stated to , \"Please don't pour any more water on me. \"     Princess Wilkins RN  04/10/23 1600

## 2023-04-11 VITALS
SYSTOLIC BLOOD PRESSURE: 95 MMHG | WEIGHT: 175 LBS | RESPIRATION RATE: 14 BRPM | HEART RATE: 100 BPM | TEMPERATURE: 98.6 F | BODY MASS INDEX: 31.01 KG/M2 | DIASTOLIC BLOOD PRESSURE: 59 MMHG | OXYGEN SATURATION: 97 % | HEIGHT: 63 IN

## 2023-04-11 LAB
EKG ATRIAL RATE: 117 BPM
EKG DIAGNOSIS: NORMAL
EKG P AXIS: 65 DEGREES
EKG P-R INTERVAL: 126 MS
EKG Q-T INTERVAL: 308 MS
EKG QRS DURATION: 84 MS
EKG QTC CALCULATION (BAZETT): 429 MS
EKG R AXIS: 62 DEGREES
EKG T AXIS: -39 DEGREES
EKG VENTRICULAR RATE: 117 BPM

## 2023-04-11 PROCEDURE — 6370000000 HC RX 637 (ALT 250 FOR IP)

## 2023-04-11 RX ADMIN — BUSPIRONE HYDROCHLORIDE 15 MG: 5 TABLET ORAL at 09:00

## 2023-04-11 ASSESSMENT — PAIN - FUNCTIONAL ASSESSMENT
PAIN_FUNCTIONAL_ASSESSMENT: NONE - DENIES PAIN
PAIN_FUNCTIONAL_ASSESSMENT: NONE - DENIES PAIN

## 2023-04-11 ASSESSMENT — LIFESTYLE VARIABLES
HOW MANY STANDARD DRINKS CONTAINING ALCOHOL DO YOU HAVE ON A TYPICAL DAY: PATIENT DOES NOT DRINK
HOW OFTEN DO YOU HAVE A DRINK CONTAINING ALCOHOL: NEVER

## 2023-04-11 NOTE — ED NOTES
Pt alert and oriented ,  remains at bedside ,updated pt and family on POC, pt denies thoughts of self harm      Alix Escobar RN  04/11/23 9703

## 2023-04-11 NOTE — ED NOTES
Constant Observer Yes - Name: Sabrina Patel RN   Constant Observer Oriented yes   High risk patients are in line of sight at all times Yes   Excess equipment/medical supplies not necessary for the care of the patient removed Yes   All sharp or dangerous objects are removed from room: including but not limited to belts, pens & pencils, needles, medications, cosmetics, lighters, matches, nail files, watches, necklaces, glass objects, razors, razor blades, knives, aerosol sprays, drawstring pants, shoes, cords (telephone, call bells, etc.) cleaning wipes or other cleaning items, aluminum cans, not permanently attached wall décor Yes   Telephone/cell phone removed as well as TV remote (batteries can be swallowed) Yes   Patient belongings removed and labeled at nurses station Yes   Excess linen is removed from room Yes   All plastic bags are removed from the room and replaced with paper trash bags Yes   Patient is in paper scrubs or appropriate gown and using hospital socks with rubber soles Yes   No metal, hard eating utensils or hard plates are on meal tray Yes   Remove all cleaning agents used by [employer] Yes   If Crucifix is hanging on a nail, remove Crucifix as well as the nail Yes       *If any question above is answered \"No,\" documentation is required.        Arvin Brock RN  04/11/23 7881

## 2023-04-11 NOTE — ED NOTES
TRANSFER - OUT REPORT:    Verbal report given to Daniel Wright RN on Nelsy Ponce  being transferred to [facility] ER for routine progression of patient care       Report consisted of patient's Situation, Background, Assessment and   Recommendations(SBAR). Information from the following report(s) Nurse Handoff Report, ED SBAR, Adult Overview, MAR, Recent Results, and Neuro Assessment was reviewed with the receiving nurse. Lines:       Opportunity for questions and clarification was provided.       Patient transported with:  [facility], [address] [place] [address]  04/11/23 1202

## 2023-04-11 NOTE — CARE COORDINATION
Patient accepted to [facility] by Dr. José Daniels. Transport arranged through [facility].      Tena East LMSW    [address] [facility]

## 2023-04-11 NOTE — ED NOTES
Constant Observer Yes - Name: sunni Seals   Constant Observer Oriented yes   High risk patients are in line of sight at all times Yes   Excess equipment/medical supplies not necessary for the care of the patient removed Yes   All sharp or dangerous objects are removed from room: including but not limited to belts, pens & pencils, needles, medications, cosmetics, lighters, matches, nail files, watches, necklaces, glass objects, razors, razor blades, knives, aerosol sprays, drawstring pants, shoes, cords (telephone, call bells, etc.) cleaning wipes or other cleaning items, aluminum cans, not permanently attached wall décor Yes   Telephone/cell phone removed as well as TV remote (batteries can be swallowed) Yes   Patient belongings removed and labeled at nurses station Yes   Excess linen is removed from room Yes   All plastic bags are removed from the room and replaced with paper trash bags Yes   Patient is in paper scrubs or appropriate gown and using hospital socks with rubber soles Yes   No metal, hard eating utensils or hard plates are on meal tray Yes   Remove all cleaning agents used by [employer] Yes   If Crucifix is hanging on a nail, remove Crucifix as well as the nail Yes       *If any question above is answered \"No,\" documentation is required.        Jenny Mauricio RN  04/11/23 5924

## 2023-04-11 NOTE — ED NOTES
Constant Observer Yes - Name:    Constant Observer Oriented yes   High risk patients are in line of sight at all times Yes   Excess equipment/medical supplies not necessary for the care of the patient removed Yes   All sharp or dangerous objects are removed from room: including but not limited to belts, pens & pencils, needles, medications, cosmetics, lighters, matches, nail files, watches, necklaces, glass objects, razors, razor blades, knives, aerosol sprays, drawstring pants, shoes, cords (telephone, call bells, etc.) cleaning wipes or other cleaning items, aluminum cans, not permanently attached wall décor Yes   Telephone/cell phone removed as well as TV remote (batteries can be swallowed) Yes   Patient belongings removed and labeled at nurses station Yes   Excess linen is removed from room Yes   All plastic bags are removed from the room and replaced with paper trash bags Yes   Patient is in paper scrubs or appropriate gown and using hospital socks with rubber soles Yes   No metal, hard eating utensils or hard plates are on meal tray Yes   Remove all cleaning agents used by [employer] Yes   If Crucifix is hanging on a nail, remove Crucifix as well as the nail Yes       *If any question above is answered \"No,\" documentation is required.        Piper Mcbride RN  04/11/23 7375

## 2023-04-25 ENCOUNTER — HOSPITAL ENCOUNTER (EMERGENCY)
Age: 43
Discharge: HOME | End: 2023-04-25
Attending: STUDENT IN AN ORGANIZED HEALTH CARE EDUCATION/TRAINING PROGRAM
Payer: COMMERCIAL

## 2023-04-25 VITALS
HEIGHT: 63 IN | WEIGHT: 176 LBS | HEART RATE: 106 BPM | SYSTOLIC BLOOD PRESSURE: 116 MMHG | OXYGEN SATURATION: 97 % | DIASTOLIC BLOOD PRESSURE: 86 MMHG | BODY MASS INDEX: 31.18 KG/M2 | TEMPERATURE: 97.5 F | RESPIRATION RATE: 19 BRPM

## 2023-04-25 DIAGNOSIS — R00.0 TACHYCARDIA: ICD-10-CM

## 2023-04-25 DIAGNOSIS — F41.1 ANXIETY STATE: ICD-10-CM

## 2023-04-25 DIAGNOSIS — E87.6 HYPOKALEMIA: Primary | ICD-10-CM

## 2023-04-25 LAB
ALBUMIN SERPL-MCNC: 3.7 G/DL (ref 3.5–5)
ALBUMIN/GLOB SERPL: 1 (ref 0.4–1.6)
ALP SERPL-CCNC: 54 U/L (ref 50–136)
ALT SERPL-CCNC: 36 U/L (ref 12–65)
AMPHET UR QL SCN: NEGATIVE
ANION GAP SERPL CALC-SCNC: 7 MMOL/L (ref 2–11)
AST SERPL-CCNC: 22 U/L (ref 15–37)
BARBITURATES UR QL SCN: NEGATIVE
BASOPHILS # BLD: 0 K/UL (ref 0–0.2)
BASOPHILS NFR BLD: 0 % (ref 0–2)
BENZODIAZ UR QL: NEGATIVE
BILIRUB SERPL-MCNC: 0.5 MG/DL (ref 0.2–1.1)
BUN SERPL-MCNC: 11 MG/DL (ref 6–23)
CALCIUM SERPL-MCNC: 9.1 MG/DL (ref 8.3–10.4)
CANNABINOIDS UR QL SCN: POSITIVE
CHLORIDE SERPL-SCNC: 108 MMOL/L (ref 101–110)
CO2 SERPL-SCNC: 22 MMOL/L (ref 21–32)
COCAINE UR QL SCN: NEGATIVE
CREAT SERPL-MCNC: 0.79 MG/DL (ref 0.6–1)
D DIMER PPP FEU-MCNC: <0.27 UG/ML(FEU)
DIFFERENTIAL METHOD BLD: NORMAL
EOSINOPHIL # BLD: 0.1 K/UL (ref 0–0.8)
EOSINOPHIL NFR BLD: 1 % (ref 0.5–7.8)
ERYTHROCYTE [DISTWIDTH] IN BLOOD BY AUTOMATED COUNT: 13.2 % (ref 11.9–14.6)
GLOBULIN SER CALC-MCNC: 3.6 G/DL (ref 2.8–4.5)
GLUCOSE SERPL-MCNC: 152 MG/DL (ref 65–100)
HCT VFR BLD AUTO: 42.1 % (ref 35.8–46.3)
HGB BLD-MCNC: 14.4 G/DL (ref 11.7–15.4)
IMM GRANULOCYTES # BLD AUTO: 0 K/UL (ref 0–0.5)
IMM GRANULOCYTES NFR BLD AUTO: 1 % (ref 0–5)
LYMPHOCYTES # BLD: 2.6 K/UL (ref 0.5–4.6)
LYMPHOCYTES NFR BLD: 40 % (ref 13–44)
MCH RBC QN AUTO: 31.9 PG (ref 26.1–32.9)
MCHC RBC AUTO-ENTMCNC: 34.2 G/DL (ref 31.4–35)
MCV RBC AUTO: 93.3 FL (ref 82–102)
METHADONE UR QL: NEGATIVE
MONOCYTES # BLD: 0.5 K/UL (ref 0.1–1.3)
MONOCYTES NFR BLD: 7 % (ref 4–12)
NEUTS SEG # BLD: 3.4 K/UL (ref 1.7–8.2)
NEUTS SEG NFR BLD: 51 % (ref 43–78)
NRBC # BLD: 0 K/UL (ref 0–0.2)
OPIATES UR QL: NEGATIVE
PCP UR QL: NEGATIVE
PLATELET # BLD AUTO: 212 K/UL (ref 150–450)
PMV BLD AUTO: 9.8 FL (ref 9.4–12.3)
POTASSIUM SERPL-SCNC: 3 MMOL/L (ref 3.5–5.1)
PROT SERPL-MCNC: 7.3 G/DL (ref 6.3–8.2)
RBC # BLD AUTO: 4.51 M/UL (ref 4.05–5.2)
SODIUM SERPL-SCNC: 137 MMOL/L (ref 133–143)
TROPONIN I SERPL HS-MCNC: 3.8 PG/ML (ref 0–14)
TROPONIN I SERPL HS-MCNC: <3 PG/ML (ref 0–14)
WBC # BLD AUTO: 6.6 K/UL (ref 4.3–11.1)

## 2023-04-25 PROCEDURE — 80053 COMPREHEN METABOLIC PANEL: CPT

## 2023-04-25 PROCEDURE — 6370000000 HC RX 637 (ALT 250 FOR IP): Performed by: STUDENT IN AN ORGANIZED HEALTH CARE EDUCATION/TRAINING PROGRAM

## 2023-04-25 PROCEDURE — 85025 COMPLETE CBC W/AUTO DIFF WBC: CPT

## 2023-04-25 PROCEDURE — 2580000003 HC RX 258: Performed by: STUDENT IN AN ORGANIZED HEALTH CARE EDUCATION/TRAINING PROGRAM

## 2023-04-25 PROCEDURE — 96360 HYDRATION IV INFUSION INIT: CPT

## 2023-04-25 PROCEDURE — 81025 URINE PREGNANCY TEST: CPT

## 2023-04-25 PROCEDURE — 93005 ELECTROCARDIOGRAM TRACING: CPT | Performed by: STUDENT IN AN ORGANIZED HEALTH CARE EDUCATION/TRAINING PROGRAM

## 2023-04-25 PROCEDURE — 84484 ASSAY OF TROPONIN QUANT: CPT

## 2023-04-25 PROCEDURE — 99284 EMERGENCY DEPT VISIT MOD MDM: CPT

## 2023-04-25 PROCEDURE — 85379 FIBRIN DEGRADATION QUANT: CPT

## 2023-04-25 PROCEDURE — 80307 DRUG TEST PRSMV CHEM ANLYZR: CPT

## 2023-04-25 RX ORDER — 0.9 % SODIUM CHLORIDE 0.9 %
1000 INTRAVENOUS SOLUTION INTRAVENOUS
Status: COMPLETED | OUTPATIENT
Start: 2023-04-25 | End: 2023-04-25

## 2023-04-25 RX ORDER — POTASSIUM CHLORIDE 20 MEQ/1
40 TABLET, EXTENDED RELEASE ORAL ONCE
Status: COMPLETED | OUTPATIENT
Start: 2023-04-25 | End: 2023-04-25

## 2023-04-25 RX ORDER — DIVALPROEX SODIUM 250 MG/1
TABLET, EXTENDED RELEASE ORAL
COMMUNITY
Start: 2023-04-17

## 2023-04-25 RX ADMIN — SODIUM CHLORIDE 1000 ML: 9 INJECTION, SOLUTION INTRAVENOUS at 20:20

## 2023-04-25 RX ADMIN — POTASSIUM CHLORIDE 40 MEQ: 1500 TABLET, EXTENDED RELEASE ORAL at 21:50

## 2023-04-25 ASSESSMENT — PAIN SCALES - GENERAL
PAINLEVEL_OUTOF10: 0
PAINLEVEL_OUTOF10: 0

## 2023-04-25 ASSESSMENT — PAIN - FUNCTIONAL ASSESSMENT: PAIN_FUNCTIONAL_ASSESSMENT: 0-10

## 2023-04-25 NOTE — ED TRIAGE NOTES
Patient ambulatory to triage with c/o feeling anxious and feeling like her heart started racing tonight. Pt denies chest pain. Pt pulse in triage 151.

## 2023-04-26 LAB
EKG ATRIAL RATE: 151 BPM
EKG DIAGNOSIS: NORMAL
EKG P AXIS: -3 DEGREES
EKG P-R INTERVAL: 104 MS
EKG Q-T INTERVAL: 338 MS
EKG QRS DURATION: 82 MS
EKG QTC CALCULATION (BAZETT): 535 MS
EKG R AXIS: 68 DEGREES
EKG T AXIS: 50 DEGREES
EKG VENTRICULAR RATE: 151 BPM
HCG UR QL: NEGATIVE

## 2023-04-26 NOTE — DISCHARGE INSTRUCTIONS
Continue taking all your medication as prescribed. Follow-up your family physician within 1 week. Continue to orally hydrate with clear liquids. Return to the ER for worsening or worrisome symptoms.

## 2023-04-26 NOTE — ED PROVIDER NOTES
Emergency Department Provider Note       PCP: CASSIE Cabrera NP   Age: 37 y.o. Sex: female     DISPOSITION Decision To Discharge 04/25/2023 11:22:56 PM       ICD-10-CM    1. Hypokalemia  E87.6       2. Anxiety state  F41.1       3. Tachycardia  R00.0           Medical Decision Making     Complexity of Problems Addressed:  1 or more acute illnesses that pose a threat to life or bodily function. Data Reviewed and Analyzed:  Category 1:   I independently ordered and reviewed each unique test.  I reviewed external records: provider visit note from PCP. Category 2:   I independently ordered and interpreted the ED EKG in the absence of a Cardiologist.    Rate:151  EKG Interpretation: EKG Interpretation: sinus rhythm, no evidence of arrhythmia  ST Segments: Nonspecific ST segments - NO STEMI        Category 3: Discussion of management or test interpretation. 51-year-old female history of anxiety presents the ER with tachycardia. States she began having fast heart rate at home. Became concerned so she came to the ER. No chest pain at this time. Reports a history of tachycardia at times. Recently had a change to her bipolar medications. Broad-based work-up was ordered. Lab work showed normal white count, stable H&H, potassium is low at 3.0, given 40 mill equivalents of oral potassium replacement. Normal troponin x2, normal kidney function. UDS only positive for THC. Heart rate has improved to below 100 after fluids and potassium. Patient likely with a combination of slight dehydration as well as anxiety contributing to her tachycardia. Will discharge home with outpatient follow-up. Given strict return precautions. Patient and family voiced understanding and agreement. Risk of Complications and/or Morbidity of Patient Management:       Is this patient to be included in the SEP-1 core measure due to severe sepsis or septic shock?  No Exclusion criteria - the patient is NOT to be

## 2023-04-26 NOTE — ED NOTES
I have reviewed discharge instructions with the patient and spouse. The patient and spouse verbalized understanding. Patient left ED via Discharge Method: ambulatory to Home with family. Opportunity for questions and clarification provided. Patient given 0 scripts. To continue your aftercare when you leave the hospital, you may receive an automated call from our care team to check in on how you are doing. This is a free service and part of our promise to provide the best care and service to meet your aftercare needs.  If you have questions, or wish to unsubscribe from this service please call [phone]. Thank you for Choosing our [facility] Emergency Department.        Kimmie Ingram RN  04/25/23 2003

## 2023-05-19 ENCOUNTER — APPOINTMENT (OUTPATIENT)
Dept: CT IMAGING | Age: 43
End: 2023-05-19
Payer: COMMERCIAL

## 2023-05-19 ENCOUNTER — HOSPITAL ENCOUNTER (EMERGENCY)
Age: 43
Discharge: HOME | End: 2023-05-19
Attending: EMERGENCY MEDICINE
Payer: COMMERCIAL

## 2023-05-19 VITALS
DIASTOLIC BLOOD PRESSURE: 78 MMHG | OXYGEN SATURATION: 100 % | BODY MASS INDEX: 30.36 KG/M2 | HEART RATE: 72 BPM | RESPIRATION RATE: 16 BRPM | TEMPERATURE: 98.4 F | WEIGHT: 165 LBS | HEIGHT: 62 IN | SYSTOLIC BLOOD PRESSURE: 118 MMHG

## 2023-05-19 DIAGNOSIS — R10.30 LOWER ABDOMINAL PAIN: Primary | ICD-10-CM

## 2023-05-19 DIAGNOSIS — R93.5 ABNORMAL ABDOMINAL CT SCAN: ICD-10-CM

## 2023-05-19 DIAGNOSIS — R10.9 LEFT FLANK PAIN: ICD-10-CM

## 2023-05-19 LAB
ALBUMIN SERPL-MCNC: 3.7 G/DL (ref 3.5–5)
ALBUMIN/GLOB SERPL: 0.9 (ref 0.4–1.6)
ALP SERPL-CCNC: 54 U/L (ref 50–130)
ALT SERPL-CCNC: 26 U/L (ref 12–65)
ANION GAP SERPL CALC-SCNC: 5 MMOL/L (ref 2–11)
AST SERPL-CCNC: 51 U/L (ref 15–37)
BASOPHILS # BLD: 0 K/UL (ref 0–0.2)
BASOPHILS NFR BLD: 1 % (ref 0–2)
BILIRUB SERPL-MCNC: 0.4 MG/DL (ref 0.2–1.1)
BUN SERPL-MCNC: 13 MG/DL (ref 6–23)
CALCIUM SERPL-MCNC: 8.8 MG/DL (ref 8.3–10.4)
CHLORIDE SERPL-SCNC: 111 MMOL/L (ref 101–110)
CO2 SERPL-SCNC: 23 MMOL/L (ref 21–32)
CREAT SERPL-MCNC: 0.96 MG/DL (ref 0.6–1)
DIFFERENTIAL METHOD BLD: ABNORMAL
EOSINOPHIL # BLD: 0 K/UL (ref 0–0.8)
EOSINOPHIL NFR BLD: 1 % (ref 0.5–7.8)
ERYTHROCYTE [DISTWIDTH] IN BLOOD BY AUTOMATED COUNT: 13.8 % (ref 11.9–14.6)
GLOBULIN SER CALC-MCNC: 4.2 G/DL (ref 2.8–4.5)
GLUCOSE SERPL-MCNC: 156 MG/DL (ref 65–100)
HCG UR QL: NEGATIVE
HCT VFR BLD AUTO: 46.4 % (ref 35.8–46.3)
HGB BLD-MCNC: 15.3 G/DL (ref 11.7–15.4)
IMM GRANULOCYTES # BLD AUTO: 0.1 K/UL (ref 0–0.5)
IMM GRANULOCYTES NFR BLD AUTO: 1 % (ref 0–5)
LIPASE SERPL-CCNC: 109 U/L (ref 73–393)
LYMPHOCYTES # BLD: 1.9 K/UL (ref 0.5–4.6)
LYMPHOCYTES NFR BLD: 27 % (ref 13–44)
MCH RBC QN AUTO: 31.9 PG (ref 26.1–32.9)
MCHC RBC AUTO-ENTMCNC: 33 G/DL (ref 31.4–35)
MCV RBC AUTO: 96.9 FL (ref 82–102)
MONOCYTES # BLD: 0.3 K/UL (ref 0.1–1.3)
MONOCYTES NFR BLD: 5 % (ref 4–12)
NEUTS SEG # BLD: 4.8 K/UL (ref 1.7–8.2)
NEUTS SEG NFR BLD: 67 % (ref 43–78)
NRBC # BLD: 0 K/UL (ref 0–0.2)
PLATELET # BLD AUTO: 231 K/UL (ref 150–450)
PMV BLD AUTO: 10.1 FL (ref 9.4–12.3)
POTASSIUM SERPL-SCNC: 4.4 MMOL/L (ref 3.5–5.1)
POTASSIUM SERPL-SCNC: 6 MMOL/L (ref 3.5–5.1)
PROT SERPL-MCNC: 7.9 G/DL (ref 6.3–8.2)
RBC # BLD AUTO: 4.79 M/UL (ref 4.05–5.2)
SODIUM SERPL-SCNC: 139 MMOL/L (ref 133–143)
WBC # BLD AUTO: 7.2 K/UL (ref 4.3–11.1)

## 2023-05-19 PROCEDURE — 96374 THER/PROPH/DIAG INJ IV PUSH: CPT

## 2023-05-19 PROCEDURE — 81025 URINE PREGNANCY TEST: CPT

## 2023-05-19 PROCEDURE — 84132 ASSAY OF SERUM POTASSIUM: CPT

## 2023-05-19 PROCEDURE — 80053 COMPREHEN METABOLIC PANEL: CPT

## 2023-05-19 PROCEDURE — 96376 TX/PRO/DX INJ SAME DRUG ADON: CPT

## 2023-05-19 PROCEDURE — 6360000002 HC RX W HCPCS: Performed by: NURSE PRACTITIONER

## 2023-05-19 PROCEDURE — 96375 TX/PRO/DX INJ NEW DRUG ADDON: CPT

## 2023-05-19 PROCEDURE — 85025 COMPLETE CBC W/AUTO DIFF WBC: CPT

## 2023-05-19 PROCEDURE — 96361 HYDRATE IV INFUSION ADD-ON: CPT

## 2023-05-19 PROCEDURE — 74177 CT ABD & PELVIS W/CONTRAST: CPT

## 2023-05-19 PROCEDURE — 36415 COLL VENOUS BLD VENIPUNCTURE: CPT

## 2023-05-19 PROCEDURE — 83690 ASSAY OF LIPASE: CPT

## 2023-05-19 PROCEDURE — 2580000003 HC RX 258: Performed by: NURSE PRACTITIONER

## 2023-05-19 PROCEDURE — 6360000004 HC RX CONTRAST MEDICATION: Performed by: NURSE PRACTITIONER

## 2023-05-19 PROCEDURE — 99285 EMERGENCY DEPT VISIT HI MDM: CPT

## 2023-05-19 RX ORDER — 0.9 % SODIUM CHLORIDE 0.9 %
100 INTRAVENOUS SOLUTION INTRAVENOUS
Status: COMPLETED | OUTPATIENT
Start: 2023-05-19 | End: 2023-05-19

## 2023-05-19 RX ORDER — ONDANSETRON 2 MG/ML
4 INJECTION INTRAMUSCULAR; INTRAVENOUS ONCE
Status: COMPLETED | OUTPATIENT
Start: 2023-05-19 | End: 2023-05-19

## 2023-05-19 RX ORDER — SODIUM CHLORIDE 0.9 % (FLUSH) 0.9 %
10 SYRINGE (ML) INJECTION
Status: COMPLETED | OUTPATIENT
Start: 2023-05-19 | End: 2023-05-19

## 2023-05-19 RX ORDER — ONDANSETRON 4 MG/1
4 TABLET, FILM COATED ORAL 3 TIMES DAILY PRN
Qty: 15 TABLET | Refills: 0 | Status: SHIPPED | OUTPATIENT
Start: 2023-05-19 | End: 2023-05-23

## 2023-05-19 RX ORDER — MORPHINE SULFATE 4 MG/ML
4 INJECTION INTRAVENOUS ONCE
Status: COMPLETED | OUTPATIENT
Start: 2023-05-19 | End: 2023-05-19

## 2023-05-19 RX ORDER — KETOROLAC TROMETHAMINE 15 MG/ML
15 INJECTION, SOLUTION INTRAMUSCULAR; INTRAVENOUS
Status: COMPLETED | OUTPATIENT
Start: 2023-05-19 | End: 2023-05-19

## 2023-05-19 RX ORDER — 0.9 % SODIUM CHLORIDE 0.9 %
1000 INTRAVENOUS SOLUTION INTRAVENOUS ONCE
Status: COMPLETED | OUTPATIENT
Start: 2023-05-19 | End: 2023-05-19

## 2023-05-19 RX ADMIN — HYDROMORPHONE HYDROCHLORIDE 1 MG: 1 INJECTION, SOLUTION INTRAMUSCULAR; INTRAVENOUS; SUBCUTANEOUS at 16:21

## 2023-05-19 RX ADMIN — SODIUM CHLORIDE 1000 ML: 9 INJECTION, SOLUTION INTRAVENOUS at 15:30

## 2023-05-19 RX ADMIN — SODIUM CHLORIDE 100 ML: 9 INJECTION, SOLUTION INTRAVENOUS at 13:48

## 2023-05-19 RX ADMIN — KETOROLAC TROMETHAMINE 15 MG: 15 INJECTION, SOLUTION INTRAMUSCULAR; INTRAVENOUS at 17:42

## 2023-05-19 RX ADMIN — ONDANSETRON 4 MG: 2 INJECTION INTRAMUSCULAR; INTRAVENOUS at 17:43

## 2023-05-19 RX ADMIN — SODIUM CHLORIDE, PRESERVATIVE FREE 10 ML: 5 INJECTION INTRAVENOUS at 13:48

## 2023-05-19 RX ADMIN — IOPAMIDOL 100 ML: 755 INJECTION, SOLUTION INTRAVENOUS at 13:48

## 2023-05-19 RX ADMIN — ONDANSETRON 4 MG: 2 INJECTION INTRAMUSCULAR; INTRAVENOUS at 14:37

## 2023-05-19 RX ADMIN — MORPHINE SULFATE 4 MG: 4 INJECTION, SOLUTION INTRAMUSCULAR; INTRAVENOUS at 14:36

## 2023-05-19 ASSESSMENT — PAIN SCALES - GENERAL
PAINLEVEL_OUTOF10: 9
PAINLEVEL_OUTOF10: 10

## 2023-05-19 ASSESSMENT — PAIN - FUNCTIONAL ASSESSMENT: PAIN_FUNCTIONAL_ASSESSMENT: 0-10

## 2023-05-19 ASSESSMENT — PAIN DESCRIPTION - ORIENTATION: ORIENTATION: LEFT

## 2023-05-19 ASSESSMENT — PAIN DESCRIPTION - LOCATION
LOCATION: ABDOMEN;BACK
LOCATION: ABDOMEN

## 2023-05-19 NOTE — DISCHARGE INSTRUCTIONS
Follow-up with recommended provider in the next 1-2 days. Return to the ED immediately for any new, worsening, concerning symptoms; or for danger signs as discussed. Follow-up regarding CT scan findings as follows:     CT ABDOMEN PELVIS W IV CONTRAST Additional Contrast? None (Final result)  Result time 05/19/23 14:55:13  Final result by Cheng Figueroa MD (05/19/23 14:55:13)                Impression:    1. No evidence of acute abdomen or acute inflammatory changes. 2.  Mild hepatomegaly with pronounced steatosis without sequelae of cirrhosis or   suspicious lesion. 3.  Decompressed distal colon without pericolonic inflammation or   diverticulosis. 4.  Small soft tissue densities about the bilateral lateral breast soft   tissues/axilla most suggestive of benign lymph nodes, and can be correlated with   patient's mammography. 5.  Ancillary findings as above. Narrative:    EXAMINATION: CT ABDOMEN PELVIS W IV CONTRAST 5/19/2023 1:52 PM     ACCESSION NUMBER:  [id number]     COMPARISON: None available     INDICATION:  Left lower quadrant abdominal tenderness, left flank and low back   pain/tenderness. TECHNIQUE: Contiguous axial computed tomographic images were obtained from the   domes of the diaphragm to the symphysis pubis following administration 100 mL   Iso-rosalinda 370. Coronal reconstructions were also performed. Oral contrast was also   administered. Radiation dose reduction techniques were used for this study. Our CT scanners   use one or all of the following: Automated exposure control, adjustment of the   mA and/or kV according to patient size, iterative reconstruction. FINDINGS:     Lung Bases: Partially imaged lung bases are clear. Liver: Mild hepatomegaly measuring 20 cm in the craniocaudal dimension. Marked   decrease parenchymal attenuation compatible steatosis. No surface contour   nodularity or suspicious lesion. Gallbladder/Biliary: Unremarkable.

## 2023-05-19 NOTE — ED PROVIDER NOTES
Emergency Department Provider Note       PCP: CASSIE Mendoza NP   Age: 37 y.o. Sex: female     [address]    1. Lower abdominal pain  R10.30       2. Left flank pain  R10.9       3. Abnormal abdominal CT scan  R93.5           Medical Decision Making     Complexity of Problems Addressed:  1 or more acute illnesses that pose a threat to life or bodily function. Data Reviewed and Analyzed:  Category 1:   I independently ordered and reviewed each unique test.  I reviewed external records: provider visit note from outside specialist.   The patients assessment required an independent historian: spouse. The reason they were needed is important historical information not provided by the patient. Category 2:   I interpreted the 's, UA result, hCG result, CT scan of the abdomen pelvis with no acute emergent process but incidental findings as by radiologist..    Category 3: Discussion of management or test interpretation. 42-year-old female presents to the emergency department with complaint of left lower quadrant abdominal pain x2 days. States pain localized to left lower quadrant onset, reports intermittent pain of fluctuating intensity that now radiates into the left lower back. Denies vaginal bleeding or discharge, denies difficulty urinating or other urinary complaint. Denies known blood in urine. Nontoxic-appearing and appears no acute distress. Status post ligation and tonsillectomy but denies other surgical process. Denies any history of kidney stones. Has remote history of pancreatitis. Denies history of PE or DVT. Denies known PE or DVT risk factors. No chest pain or difficulty breathing and denies all other. In HPI. Patient is pleasant, well-appearing. Appears to be in pain, nontoxic-appearing. She has left lower quadrant tenderness no guarding rebound rigidity. Reports remote tubal ligation. She has no flank or CVA tenderness. No radiation of pain.

## 2023-05-19 NOTE — ED NOTES
I have reviewed discharge instructions with the patient. The patient verbalized understanding. Patient left ED via Discharge Method: ambulatory to Home with spouse. Opportunity for questions and clarification provided. Patient given 1 scripts. To continue your aftercare when you leave the hospital, you may receive an automated call from our care team to check in on how you are doing. This is a free service and part of our promise to provide the best care and service to meet your aftercare needs.  If you have questions, or wish to unsubscribe from this service please call [phone]. Thank you for Choosing our [facility] Emergency Department.        Demaris Brunner, RN  05/19/23 5870

## 2023-05-19 NOTE — ED TRIAGE NOTES
Patient advises that she started with left sided abdominal pain with radiation into midaxillary and left flank area started on 5/15 and been getting worse. Patient advises history of pancreatitis however 20 years ago. No further complaints.  Patient advises period stopped on 5/17

## 2024-04-30 ENCOUNTER — APPOINTMENT (OUTPATIENT)
Dept: CT IMAGING | Age: 44
End: 2024-04-30
Payer: COMMERCIAL

## 2024-04-30 ENCOUNTER — HOSPITAL ENCOUNTER (EMERGENCY)
Age: 44
Discharge: HOME | End: 2024-04-30
Payer: COMMERCIAL

## 2024-04-30 VITALS
WEIGHT: 197 LBS | HEART RATE: 118 BPM | OXYGEN SATURATION: 94 % | SYSTOLIC BLOOD PRESSURE: 103 MMHG | HEIGHT: 63 IN | BODY MASS INDEX: 34.91 KG/M2 | DIASTOLIC BLOOD PRESSURE: 87 MMHG | TEMPERATURE: 97.4 F | RESPIRATION RATE: 17 BRPM

## 2024-04-30 DIAGNOSIS — M54.50 LEFT-SIDED LOW BACK PAIN WITHOUT SCIATICA, UNSPECIFIED CHRONICITY: ICD-10-CM

## 2024-04-30 DIAGNOSIS — R10.9 FLANK PAIN: Primary | ICD-10-CM

## 2024-04-30 LAB
ALBUMIN SERPL-MCNC: 4 G/DL (ref 3.5–5)
ALBUMIN/GLOB SERPL: 1.1 (ref 1–1.9)
ALP SERPL-CCNC: 56 U/L (ref 35–104)
ALT SERPL-CCNC: 16 U/L (ref 12–65)
ANION GAP SERPL CALC-SCNC: 14 MMOL/L (ref 9–18)
AST SERPL-CCNC: 27 U/L (ref 15–37)
BASOPHILS # BLD: 0.1 K/UL (ref 0–0.2)
BASOPHILS NFR BLD: 1 % (ref 0–2)
BILIRUB SERPL-MCNC: 0.3 MG/DL (ref 0–1.2)
BILIRUB UR QL: NEGATIVE
BUN SERPL-MCNC: 17 MG/DL (ref 6–23)
CALCIUM SERPL-MCNC: 9.5 MG/DL (ref 8.8–10.2)
CHLORIDE SERPL-SCNC: 102 MMOL/L (ref 98–107)
CO2 SERPL-SCNC: 20 MMOL/L (ref 20–28)
CREAT SERPL-MCNC: 0.94 MG/DL (ref 0.6–1.1)
DIFFERENTIAL METHOD BLD: NORMAL
EOSINOPHIL # BLD: 0.1 K/UL (ref 0–0.8)
EOSINOPHIL NFR BLD: 2 % (ref 0.5–7.8)
ERYTHROCYTE [DISTWIDTH] IN BLOOD BY AUTOMATED COUNT: 13.2 % (ref 11.9–14.6)
GLOBULIN SER CALC-MCNC: 3.6 G/DL (ref 2.3–3.5)
GLUCOSE SERPL-MCNC: 101 MG/DL (ref 70–99)
GLUCOSE UR QL STRIP.AUTO: NEGATIVE MG/DL
HCG UR QL: NEGATIVE
HCT VFR BLD AUTO: 44.7 % (ref 35.8–46.3)
HGB BLD-MCNC: 15.2 G/DL (ref 11.7–15.4)
IMM GRANULOCYTES # BLD AUTO: 0.1 K/UL (ref 0–0.5)
IMM GRANULOCYTES NFR BLD AUTO: 1 % (ref 0–5)
KETONES UR-MCNC: NEGATIVE MG/DL
LEUKOCYTE ESTERASE UR QL STRIP: NEGATIVE
LIPASE SERPL-CCNC: 18 U/L (ref 13–60)
LYMPHOCYTES # BLD: 2 K/UL (ref 0.5–4.6)
LYMPHOCYTES NFR BLD: 30 % (ref 13–44)
MCH RBC QN AUTO: 32.3 PG (ref 26.1–32.9)
MCHC RBC AUTO-ENTMCNC: 34 G/DL (ref 31.4–35)
MCV RBC AUTO: 94.9 FL (ref 82–102)
MONOCYTES # BLD: 0.4 K/UL (ref 0.1–1.3)
MONOCYTES NFR BLD: 6 % (ref 4–12)
NEUTS SEG # BLD: 4 K/UL (ref 1.7–8.2)
NEUTS SEG NFR BLD: 61 % (ref 43–78)
NITRITE UR QL: NEGATIVE
NRBC # BLD: 0 K/UL (ref 0–0.2)
PH UR: 6.5 (ref 5–9)
PLATELET # BLD AUTO: 223 K/UL (ref 150–450)
PMV BLD AUTO: 10.1 FL (ref 9.4–12.3)
POTASSIUM SERPL-SCNC: 5 MMOL/L (ref 3.5–5.1)
PROT SERPL-MCNC: 7.6 G/DL (ref 6.3–8.2)
PROT UR QL: NEGATIVE MG/DL
RBC # BLD AUTO: 4.71 M/UL (ref 4.05–5.2)
RBC # UR STRIP: NEGATIVE
SERVICE CMNT-IMP: NORMAL
SODIUM SERPL-SCNC: 136 MMOL/L (ref 136–145)
SP GR UR: 1.02 (ref 1–1.02)
UROBILINOGEN UR QL: 0.2 EU/DL (ref 0.2–1)
WBC # BLD AUTO: 6.6 K/UL (ref 4.3–11.1)

## 2024-04-30 PROCEDURE — 2580000003 HC RX 258: Performed by: PHYSICIAN ASSISTANT

## 2024-04-30 PROCEDURE — 80053 COMPREHEN METABOLIC PANEL: CPT

## 2024-04-30 PROCEDURE — 83690 ASSAY OF LIPASE: CPT

## 2024-04-30 PROCEDURE — 99285 EMERGENCY DEPT VISIT HI MDM: CPT

## 2024-04-30 PROCEDURE — 96374 THER/PROPH/DIAG INJ IV PUSH: CPT

## 2024-04-30 PROCEDURE — 81003 URINALYSIS AUTO W/O SCOPE: CPT

## 2024-04-30 PROCEDURE — 96375 TX/PRO/DX INJ NEW DRUG ADDON: CPT

## 2024-04-30 PROCEDURE — 81025 URINE PREGNANCY TEST: CPT

## 2024-04-30 PROCEDURE — 96361 HYDRATE IV INFUSION ADD-ON: CPT

## 2024-04-30 PROCEDURE — 85025 COMPLETE CBC W/AUTO DIFF WBC: CPT

## 2024-04-30 PROCEDURE — 6360000004 HC RX CONTRAST MEDICATION: Performed by: PHYSICIAN ASSISTANT

## 2024-04-30 PROCEDURE — 74177 CT ABD & PELVIS W/CONTRAST: CPT

## 2024-04-30 PROCEDURE — 6360000002 HC RX W HCPCS: Performed by: PHYSICIAN ASSISTANT

## 2024-04-30 RX ORDER — HYDROCODONE BITARTRATE AND ACETAMINOPHEN 5; 325 MG/1; MG/1
1 TABLET ORAL EVERY 6 HOURS PRN
Qty: 10 TABLET | Refills: 0 | Status: SHIPPED | OUTPATIENT
Start: 2024-04-30 | End: 2024-05-03

## 2024-04-30 RX ORDER — PREDNISONE 50 MG/1
50 TABLET ORAL DAILY
Qty: 5 TABLET | Refills: 0 | Status: SHIPPED | OUTPATIENT
Start: 2024-04-30 | End: 2024-05-05

## 2024-04-30 RX ORDER — 0.9 % SODIUM CHLORIDE 0.9 %
1000 INTRAVENOUS SOLUTION INTRAVENOUS ONCE
Status: COMPLETED | OUTPATIENT
Start: 2024-04-30 | End: 2024-04-30

## 2024-04-30 RX ORDER — METHOCARBAMOL 500 MG/1
500 TABLET, FILM COATED ORAL 3 TIMES DAILY
Qty: 30 TABLET | Refills: 0 | Status: SHIPPED | OUTPATIENT
Start: 2024-04-30 | End: 2024-05-10

## 2024-04-30 RX ORDER — KETOROLAC TROMETHAMINE 15 MG/ML
15 INJECTION, SOLUTION INTRAMUSCULAR; INTRAVENOUS ONCE
Status: COMPLETED | OUTPATIENT
Start: 2024-04-30 | End: 2024-04-30

## 2024-04-30 RX ORDER — DEXAMETHASONE SODIUM PHOSPHATE 10 MG/ML
8 INJECTION INTRAMUSCULAR; INTRAVENOUS
Status: COMPLETED | OUTPATIENT
Start: 2024-04-30 | End: 2024-04-30

## 2024-04-30 RX ORDER — ONDANSETRON 2 MG/ML
4 INJECTION INTRAMUSCULAR; INTRAVENOUS ONCE
Status: COMPLETED | OUTPATIENT
Start: 2024-04-30 | End: 2024-04-30

## 2024-04-30 RX ORDER — MORPHINE SULFATE 4 MG/ML
4 INJECTION, SOLUTION INTRAMUSCULAR; INTRAVENOUS
Status: COMPLETED | OUTPATIENT
Start: 2024-04-30 | End: 2024-04-30

## 2024-04-30 RX ADMIN — MORPHINE SULFATE 4 MG: 4 INJECTION, SOLUTION INTRAMUSCULAR; INTRAVENOUS at 10:00

## 2024-04-30 RX ADMIN — KETOROLAC TROMETHAMINE 15 MG: 15 INJECTION, SOLUTION INTRAMUSCULAR; INTRAVENOUS at 10:01

## 2024-04-30 RX ADMIN — ONDANSETRON 4 MG: 2 INJECTION INTRAMUSCULAR; INTRAVENOUS at 09:58

## 2024-04-30 RX ADMIN — SODIUM CHLORIDE 1000 ML: 9 INJECTION, SOLUTION INTRAVENOUS at 09:58

## 2024-04-30 RX ADMIN — DEXAMETHASONE SODIUM PHOSPHATE 8 MG: 10 INJECTION INTRAMUSCULAR; INTRAVENOUS at 10:02

## 2024-04-30 RX ADMIN — IOPAMIDOL 100 ML: 755 INJECTION, SOLUTION INTRAVENOUS at 10:31

## 2024-04-30 ASSESSMENT — ENCOUNTER SYMPTOMS
GASTROINTESTINAL NEGATIVE: 1
RESPIRATORY NEGATIVE: 1

## 2024-04-30 ASSESSMENT — PAIN DESCRIPTION - DESCRIPTORS: DESCRIPTORS: ACHING;DISCOMFORT

## 2024-04-30 ASSESSMENT — LIFESTYLE VARIABLES
HOW OFTEN DO YOU HAVE A DRINK CONTAINING ALCOHOL: NEVER
HOW MANY STANDARD DRINKS CONTAINING ALCOHOL DO YOU HAVE ON A TYPICAL DAY: PATIENT DOES NOT DRINK

## 2024-04-30 ASSESSMENT — PAIN DESCRIPTION - ORIENTATION: ORIENTATION: LEFT

## 2024-04-30 ASSESSMENT — PAIN - FUNCTIONAL ASSESSMENT: PAIN_FUNCTIONAL_ASSESSMENT: 0-10

## 2024-04-30 ASSESSMENT — PAIN DESCRIPTION - FREQUENCY: FREQUENCY: CONTINUOUS

## 2024-04-30 ASSESSMENT — PAIN SCALES - GENERAL
PAINLEVEL_OUTOF10: 7

## 2024-04-30 ASSESSMENT — PAIN DESCRIPTION - PAIN TYPE: TYPE: ACUTE PAIN

## 2024-04-30 ASSESSMENT — PAIN DESCRIPTION - LOCATION: LOCATION: FLANK

## 2024-04-30 NOTE — ED NOTES
Patient mobility status  with no difficulty. Provider aware     I have reviewed discharge instructions with the patient.  The patient verbalized understanding.    Patient left ED via Discharge Method: ambulatory to Home with Spouse.    Opportunity for questions and clarification provided.     Patient given 0 scripts.      X 3 prescriptions sent to pharmacy

## 2024-04-30 NOTE — ED TRIAGE NOTES
Pt reports 1wk of worsened L flank pain radiating from L abdomen to L back and down to hip. Denies injury or strenuous activity   (-)dysuria, hematuria, fever, nausea, emesis, diarrhea    A&Ox4

## 2024-04-30 NOTE — ED PROVIDER NOTES
Fatty infiltration of the liver. No focal lesions.    No evidence of mass, adenopathy, or abnormal fluid collections.    No free fluid or inflammatory changes.    No gallstones or biliary ductal dilatation.    No suspicious solid renal masses. No hydronephrosis. No obstructive uropathy or  renal stones.    No mechanical bowel obstruction.    No inflammatory or ischemic bowel changes.    Tubal ligation clips are noted. Symmetry pelvic fat and fascial planes. No  pelvic lymphadenopathy or inguinal hernia.    Urinary bladder is normal as seen.    Remainder of findings as above.      If there are any questions about this report, I can be reached on [employer]  or at [phone]     CBC with Auto Differential   Result Value Ref Range    WBC 6.6 4.3 - 11.1 K/uL    RBC 4.71 4.05 - 5.2 M/uL    Hemoglobin 15.2 11.7 - 15.4 g/dL    Hematocrit 44.7 35.8 - 46.3 %    MCV 94.9 82.0 - 102.0 FL    MCH 32.3 26.1 - 32.9 PG    MCHC 34.0 31.4 - 35.0 g/dL    RDW 13.2 11.9 - 14.6 %    Platelets 223 150 - 450 K/uL    MPV 10.1 9.4 - 12.3 FL    nRBC 0.00 0.0 - 0.2 K/uL    Differential Type AUTOMATED      Neutrophils % 61 43 - 78 %    Lymphocytes % 30 13 - 44 %    Monocytes % 6 4.0 - 12.0 %    Eosinophils % 2 0.5 - 7.8 %    Basophils % 1 0.0 - 2.0 %    Immature Granulocytes % 1 0.0 - 5.0 %    Neutrophils Absolute 4.0 1.7 - 8.2 K/UL    Lymphocytes Absolute 2.0 0.5 - 4.6 K/UL    Monocytes Absolute 0.4 0.1 - 1.3 K/UL    Eosinophils Absolute 0.1 0.0 - 0.8 K/UL    Basophils Absolute 0.1 0.0 - 0.2 K/UL    Immature Granulocytes Absolute 0.1 0.0 - 0.5 K/UL   CMP   Result Value Ref Range    Sodium 136 136 - 145 mmol/L    Potassium 5.0 3.5 - 5.1 mmol/L    Chloride 102 98 - 107 mmol/L    CO2 20 20 - 28 mmol/L    Anion Gap 14 9 - 18 mmol/L    Glucose 101 (H) 70 - 99 mg/dL    BUN 17 6 - 23 MG/DL    Creatinine 0.94 0.60 - 1.10 MG/DL    Est, Glom Filt Rate 77 >60 ml/min/1.73m2    Calcium 9.5 8.8 - 10.2 MG/DL    Total Bilirubin 0.3 0.0 - 1.2 MG/DL    ALT 16

## 2024-05-02 ENCOUNTER — OFFICE VISIT (OUTPATIENT)
Dept: ORTHOPEDIC SURGERY | Age: 44
End: 2024-05-02
Payer: COMMERCIAL

## 2024-05-02 VITALS — WEIGHT: 197 LBS | BODY MASS INDEX: 34.91 KG/M2 | HEIGHT: 63 IN

## 2024-05-02 DIAGNOSIS — M47.816 LUMBAR SPONDYLOSIS: Primary | ICD-10-CM

## 2024-05-02 DIAGNOSIS — R10.9 LEFT FLANK PAIN: ICD-10-CM

## 2024-05-02 PROCEDURE — 99204 OFFICE O/P NEW MOD 45 MIN: CPT | Performed by: PHYSICIAN ASSISTANT

## 2024-05-02 RX ORDER — METHYLPREDNISOLONE 4 MG/1
TABLET ORAL
Qty: 1 KIT | Refills: 0 | Status: SHIPPED | OUTPATIENT
Start: 2024-05-02

## 2024-05-02 NOTE — PROGRESS NOTES
Name: Lexy Russ  YOB: 1980  Gender: female  MRN: [id number]    CC:   Chief Complaint   Patient presents with    New Patient     Low back pain          HPI:   Lexy Russ is a 44 y.o. female with a PMHx of comorbidities below.      They present here for evaluation of left-sided back and flank pain.  Started about 1 to 2 weeks ago.  She was seen in the emergency room recently a few days ago where they prescribed her some oral steroids, muscle relaxer and a few days of Norco.  She had taken Aleve before this.  She denies any radicular symptoms to the lower extremities.  She feels like her pain is better in the last couple of days since starting these medications.  She denies any injury or trauma at the onset.          Past Medical History Includes:   Past Medical History:   Diagnosis Date    Depression     Migraine    , No past surgical history on file.  Family History: No family history on file.   Social History:   Social History     Tobacco Use    Smoking status: Never    Smokeless tobacco: Never   Substance Use Topics    Alcohol use: Yes       ALLERGIES: No Known Allergies     Patient Medications    Current Outpatient Medications   Medication Sig Dispense Refill    predniSONE (DELTASONE) 50 MG tablet Take 1 tablet by mouth daily for 5 days 5 tablet 0    methocarbamol (ROBAXIN) 500 MG tablet Take 1 tablet by mouth 3 times daily for 10 days 30 tablet 0    HYDROcodone-acetaminophen (NORCO) 5-325 MG per tablet Take 1 tablet by mouth every 6 hours as needed for Pain for up to 3 days. Intended supply: 3 days. Take lowest dose possible to manage pain Max Daily Amount: 4 tablets 10 tablet 0    divalproex (DEPAKOTE ER) 250 MG extended release tablet TAKE 1 TABLET 3 TIMES A DAY FOR MOOD      busPIRone (BUSPAR) 15 MG tablet Take 15 mg by mouth in the morning and at bedtime      hydrOXYzine HCl (ATARAX) 25 MG tablet Take 1 tablet by mouth at bedtime Take 1-2 every bedtime as needed      eszopiclone

## 2024-05-24 ENCOUNTER — APPOINTMENT (OUTPATIENT)
Dept: CT IMAGING | Age: 44
End: 2024-05-24
Payer: COMMERCIAL

## 2024-05-24 ENCOUNTER — HOSPITAL ENCOUNTER (EMERGENCY)
Age: 44
Discharge: HOME | End: 2024-05-24
Attending: EMERGENCY MEDICINE
Payer: COMMERCIAL

## 2024-05-24 VITALS
OXYGEN SATURATION: 94 % | WEIGHT: 197 LBS | RESPIRATION RATE: 17 BRPM | TEMPERATURE: 98.4 F | HEART RATE: 92 BPM | HEIGHT: 63 IN | SYSTOLIC BLOOD PRESSURE: 109 MMHG | DIASTOLIC BLOOD PRESSURE: 81 MMHG | BODY MASS INDEX: 34.91 KG/M2

## 2024-05-24 DIAGNOSIS — K52.9 COLITIS: Primary | ICD-10-CM

## 2024-05-24 LAB
ALBUMIN SERPL-MCNC: 3.7 G/DL (ref 3.5–5)
ALBUMIN/GLOB SERPL: 1.2 (ref 1–1.9)
ALP SERPL-CCNC: 58 U/L (ref 35–104)
ALT SERPL-CCNC: 32 U/L (ref 12–65)
ANION GAP SERPL CALC-SCNC: 15 MMOL/L (ref 9–18)
AST SERPL-CCNC: 20 U/L (ref 15–37)
BASOPHILS # BLD: 0 K/UL (ref 0–0.2)
BASOPHILS NFR BLD: 1 % (ref 0–2)
BILIRUB SERPL-MCNC: 0.5 MG/DL (ref 0–1.2)
BILIRUB UR QL: ABNORMAL
BUN SERPL-MCNC: 11 MG/DL (ref 6–23)
CALCIUM SERPL-MCNC: 9.6 MG/DL (ref 8.8–10.2)
CHLORIDE SERPL-SCNC: 102 MMOL/L (ref 98–107)
CO2 SERPL-SCNC: 22 MMOL/L (ref 20–28)
CREAT SERPL-MCNC: 0.83 MG/DL (ref 0.6–1.1)
DIFFERENTIAL METHOD BLD: ABNORMAL
EOSINOPHIL # BLD: 0 K/UL (ref 0–0.8)
EOSINOPHIL NFR BLD: 0 % (ref 0.5–7.8)
ERYTHROCYTE [DISTWIDTH] IN BLOOD BY AUTOMATED COUNT: 13.7 % (ref 11.9–14.6)
GLOBULIN SER CALC-MCNC: 3.2 G/DL (ref 2.3–3.5)
GLUCOSE SERPL-MCNC: 119 MG/DL (ref 70–99)
GLUCOSE UR QL STRIP.AUTO: NEGATIVE MG/DL
HCG UR QL: NEGATIVE
HCT VFR BLD AUTO: 42.1 % (ref 35.8–46.3)
HGB BLD-MCNC: 14.5 G/DL (ref 11.7–15.4)
IMM GRANULOCYTES # BLD AUTO: 0 K/UL (ref 0–0.5)
IMM GRANULOCYTES NFR BLD AUTO: 0 % (ref 0–5)
KETONES UR-MCNC: ABNORMAL MG/DL
LEUKOCYTE ESTERASE UR QL STRIP: NEGATIVE
LIPASE SERPL-CCNC: 17 U/L (ref 13–60)
LYMPHOCYTES # BLD: 1.5 K/UL (ref 0.5–4.6)
LYMPHOCYTES NFR BLD: 21 % (ref 13–44)
MCH RBC QN AUTO: 33 PG (ref 26.1–32.9)
MCHC RBC AUTO-ENTMCNC: 34.4 G/DL (ref 31.4–35)
MCV RBC AUTO: 95.7 FL (ref 82–102)
MONOCYTES # BLD: 0.8 K/UL (ref 0.1–1.3)
MONOCYTES NFR BLD: 12 % (ref 4–12)
NEUTS SEG # BLD: 4.8 K/UL (ref 1.7–8.2)
NEUTS SEG NFR BLD: 67 % (ref 43–78)
NITRITE UR QL: NEGATIVE
NRBC # BLD: 0 K/UL (ref 0–0.2)
PH UR: 6 (ref 5–9)
PLATELET # BLD AUTO: 171 K/UL (ref 150–450)
PMV BLD AUTO: 9.9 FL (ref 9.4–12.3)
POTASSIUM SERPL-SCNC: 4.1 MMOL/L (ref 3.5–5.1)
PROT SERPL-MCNC: 6.9 G/DL (ref 6.3–8.2)
PROT UR QL: 30 MG/DL
RBC # BLD AUTO: 4.4 M/UL (ref 4.05–5.2)
RBC # UR STRIP: NEGATIVE
SERVICE CMNT-IMP: ABNORMAL
SODIUM SERPL-SCNC: 139 MMOL/L (ref 136–145)
SP GR UR: >1.03 (ref 1–1.02)
UROBILINOGEN UR QL: 0.2 EU/DL (ref 0.2–1)
WBC # BLD AUTO: 7.2 K/UL (ref 4.3–11.1)

## 2024-05-24 PROCEDURE — 85025 COMPLETE CBC W/AUTO DIFF WBC: CPT

## 2024-05-24 PROCEDURE — 81025 URINE PREGNANCY TEST: CPT

## 2024-05-24 PROCEDURE — 99285 EMERGENCY DEPT VISIT HI MDM: CPT

## 2024-05-24 PROCEDURE — 6360000002 HC RX W HCPCS: Performed by: EMERGENCY MEDICINE

## 2024-05-24 PROCEDURE — 96374 THER/PROPH/DIAG INJ IV PUSH: CPT

## 2024-05-24 PROCEDURE — 80053 COMPREHEN METABOLIC PANEL: CPT

## 2024-05-24 PROCEDURE — 96375 TX/PRO/DX INJ NEW DRUG ADDON: CPT

## 2024-05-24 PROCEDURE — 6360000004 HC RX CONTRAST MEDICATION: Performed by: EMERGENCY MEDICINE

## 2024-05-24 PROCEDURE — 81003 URINALYSIS AUTO W/O SCOPE: CPT

## 2024-05-24 PROCEDURE — 74177 CT ABD & PELVIS W/CONTRAST: CPT

## 2024-05-24 PROCEDURE — 83690 ASSAY OF LIPASE: CPT

## 2024-05-24 RX ORDER — ONDANSETRON 2 MG/ML
4 INJECTION INTRAMUSCULAR; INTRAVENOUS
Status: COMPLETED | OUTPATIENT
Start: 2024-05-24 | End: 2024-05-24

## 2024-05-24 RX ORDER — AMOXICILLIN AND CLAVULANATE POTASSIUM 875; 125 MG/1; MG/1
1 TABLET, FILM COATED ORAL 2 TIMES DAILY
Qty: 14 TABLET | Refills: 0 | Status: SHIPPED | OUTPATIENT
Start: 2024-05-24 | End: 2024-05-31

## 2024-05-24 RX ORDER — TRAMADOL HYDROCHLORIDE 50 MG/1
50 TABLET ORAL EVERY 6 HOURS PRN
Qty: 12 TABLET | Refills: 0 | Status: SHIPPED | OUTPATIENT
Start: 2024-05-24 | End: 2024-05-27

## 2024-05-24 RX ORDER — KETOROLAC TROMETHAMINE 30 MG/ML
30 INJECTION, SOLUTION INTRAMUSCULAR; INTRAVENOUS
Status: COMPLETED | OUTPATIENT
Start: 2024-05-24 | End: 2024-05-24

## 2024-05-24 RX ADMIN — KETOROLAC TROMETHAMINE 30 MG: 30 INJECTION, SOLUTION INTRAMUSCULAR at 11:00

## 2024-05-24 RX ADMIN — ONDANSETRON 4 MG: 2 INJECTION INTRAMUSCULAR; INTRAVENOUS at 11:00

## 2024-05-24 RX ADMIN — IOPAMIDOL 100 ML: 755 INJECTION, SOLUTION INTRAVENOUS at 11:15

## 2024-05-24 ASSESSMENT — PAIN - FUNCTIONAL ASSESSMENT: PAIN_FUNCTIONAL_ASSESSMENT: NONE - DENIES PAIN

## 2024-05-24 ASSESSMENT — PAIN DESCRIPTION - LOCATION: LOCATION: ABDOMEN

## 2024-05-24 ASSESSMENT — ENCOUNTER SYMPTOMS
ABDOMINAL PAIN: 1
DIARRHEA: 1
NAUSEA: 1
VOMITING: 0

## 2024-05-24 ASSESSMENT — PAIN SCALES - GENERAL
PAINLEVEL_OUTOF10: 8
PAINLEVEL_OUTOF10: 8

## 2024-05-24 NOTE — ED PROVIDER NOTES
Emergency Department Provider Note       PCP: Aki Story DO   Age: 44 y.o.   Sex: female     DISPOSITION Decision To Discharge 05/24/2024 01:10:38 PM       ICD-10-CM    1. Colitis  K52.9 traMADol (ULTRAM) 50 MG tablet          Medical Decision Making     No clinically significant maladies noted on blood work, urinalysis shows no evidence of infection.  CAT scan did show some inflammation in the cecum but a normal-appearing appendix according to radiology.  Patient will be discharged and treated for colitis.     1 acute, uncomplicated illness or injury.      I independently ordered and reviewed each unique test.                     History     Patient presents to the ER for evaluation with complaints of right-sided abdominal pain as well as nausea and diarrhea.  She reports fever last night of 100.9 as well as some chills and diaphoresis.  Onset of symptoms was in the past 24 to 36 hours.  She states she did not sleep well due to the discomfort which she rates 7/10 in intensity.  It is occasionally dull and occasionally sharp and stabbing in nature.  No increasing or decreasing factors are noted and no radiation.  She states incidentally that she is 3 weeks late for her menses however has not had intercourse in 5 years and has had a tubal ligation.  She denies dysuria or hematuria or history of kidney stones.    The history is provided by the patient and medical records.       ROS     Review of Systems   Constitutional:  Positive for chills, diaphoresis and fever.   Gastrointestinal:  Positive for abdominal pain, diarrhea and nausea. Negative for vomiting.   All other systems reviewed and are negative.       Physical Exam     Vitals signs and nursing note reviewed:  Vitals:    05/24/24 0924 05/24/24 1302 05/24/24 1306   BP: 137/87  114/67   Pulse: (!) 112  91   Resp: 18  17   Temp: 97.6 °F (36.4 °C)     TempSrc: Oral     SpO2: 94%  94%   Weight: 89.4 kg (197 lb) 89.4 kg (197 lb)    Height:  1.6 m (5' 3\")

## 2024-05-24 NOTE — ED TRIAGE NOTES
Pt c/o RLQ pain since yesterday that radiates to middle of abdomen, pt also c/o fevers and nausea with no vomiting. Pt states she took tylenol, aleve, and ibuprofen at 0600 today.

## 2024-05-24 NOTE — ED NOTES
Patient mobility status  with no difficulty. Provider aware     I have reviewed discharge instructions with the patient.  The patient verbalized understanding.    Patient left ED via Discharge Method: ambulatory to Home with Significant Other.    Opportunity for questions and clarification provided.     Patient given 2 e-scripts.

## 2024-07-15 ENCOUNTER — APPOINTMENT (OUTPATIENT)
Dept: GENERAL RADIOLOGY | Age: 44
End: 2024-07-15
Payer: COMMERCIAL

## 2024-07-15 ENCOUNTER — HOSPITAL ENCOUNTER (EMERGENCY)
Age: 44
Discharge: HOME | End: 2024-07-16
Attending: EMERGENCY MEDICINE
Payer: COMMERCIAL

## 2024-07-15 DIAGNOSIS — R79.89 ELEVATED TSH: ICD-10-CM

## 2024-07-15 DIAGNOSIS — R00.0 SINUS TACHYCARDIA: Primary | ICD-10-CM

## 2024-07-15 LAB
ALBUMIN SERPL-MCNC: 3.6 G/DL (ref 3.5–5)
ALBUMIN/GLOB SERPL: 1.1 (ref 1–1.9)
ALP SERPL-CCNC: 69 U/L (ref 35–104)
ALT SERPL-CCNC: 29 U/L (ref 12–65)
ANION GAP SERPL CALC-SCNC: 17 MMOL/L (ref 9–18)
AST SERPL-CCNC: 28 U/L (ref 15–37)
BASOPHILS # BLD: 0 K/UL (ref 0–0.2)
BASOPHILS NFR BLD: 0 % (ref 0–2)
BILIRUB SERPL-MCNC: 0.3 MG/DL (ref 0–1.2)
BUN SERPL-MCNC: 12 MG/DL (ref 6–23)
CALCIUM SERPL-MCNC: 9.3 MG/DL (ref 8.8–10.2)
CHLORIDE SERPL-SCNC: 98 MMOL/L (ref 98–107)
CO2 SERPL-SCNC: 21 MMOL/L (ref 20–28)
CREAT SERPL-MCNC: 0.92 MG/DL (ref 0.6–1.1)
D DIMER PPP FEU-MCNC: <0.27 UG/ML(FEU)
DIFFERENTIAL METHOD BLD: NORMAL
EOSINOPHIL # BLD: 0.1 K/UL (ref 0–0.8)
EOSINOPHIL NFR BLD: 1 % (ref 0.5–7.8)
ERYTHROCYTE [DISTWIDTH] IN BLOOD BY AUTOMATED COUNT: 12.9 % (ref 11.9–14.6)
GLOBULIN SER CALC-MCNC: 3.2 G/DL (ref 2.3–3.5)
GLUCOSE SERPL-MCNC: 176 MG/DL (ref 70–99)
HCT VFR BLD AUTO: 41.7 % (ref 35.8–46.3)
HGB BLD-MCNC: 14.2 G/DL (ref 11.7–15.4)
IMM GRANULOCYTES # BLD AUTO: 0.1 K/UL (ref 0–0.5)
IMM GRANULOCYTES NFR BLD AUTO: 1 % (ref 0–5)
LYMPHOCYTES # BLD: 3.1 K/UL (ref 0.5–4.6)
LYMPHOCYTES NFR BLD: 36 % (ref 13–44)
MAGNESIUM SERPL-MCNC: 1.7 MG/DL (ref 1.8–2.4)
MCH RBC QN AUTO: 32.2 PG (ref 26.1–32.9)
MCHC RBC AUTO-ENTMCNC: 34.1 G/DL (ref 31.4–35)
MCV RBC AUTO: 94.6 FL (ref 82–102)
MONOCYTES # BLD: 0.5 K/UL (ref 0.1–1.3)
MONOCYTES NFR BLD: 6 % (ref 4–12)
NEUTS SEG # BLD: 4.7 K/UL (ref 1.7–8.2)
NEUTS SEG NFR BLD: 56 % (ref 43–78)
NRBC # BLD: 0 K/UL (ref 0–0.2)
PLATELET # BLD AUTO: 205 K/UL (ref 150–450)
PMV BLD AUTO: 9.5 FL (ref 9.4–12.3)
POTASSIUM SERPL-SCNC: 3.7 MMOL/L (ref 3.5–5.1)
PROT SERPL-MCNC: 6.8 G/DL (ref 6.3–8.2)
RBC # BLD AUTO: 4.41 M/UL (ref 4.05–5.2)
SODIUM SERPL-SCNC: 136 MMOL/L (ref 136–145)
T4 FREE SERPL-MCNC: 1 NG/DL (ref 0.9–1.7)
TROPONIN T SERPL HS-MCNC: <6 NG/L (ref 0–14)
TSH W FREE THYROID IF ABNORMAL: 6.8 UIU/ML (ref 0.27–4.2)
WBC # BLD AUTO: 8.4 K/UL (ref 4.3–11.1)

## 2024-07-15 PROCEDURE — 85025 COMPLETE CBC W/AUTO DIFF WBC: CPT

## 2024-07-15 PROCEDURE — 84443 ASSAY THYROID STIM HORMONE: CPT

## 2024-07-15 PROCEDURE — 2500000003 HC RX 250 WO HCPCS: Performed by: EMERGENCY MEDICINE

## 2024-07-15 PROCEDURE — 99285 EMERGENCY DEPT VISIT HI MDM: CPT

## 2024-07-15 PROCEDURE — 2580000003 HC RX 258: Performed by: EMERGENCY MEDICINE

## 2024-07-15 PROCEDURE — 83735 ASSAY OF MAGNESIUM: CPT

## 2024-07-15 PROCEDURE — 80053 COMPREHEN METABOLIC PANEL: CPT

## 2024-07-15 PROCEDURE — 84439 ASSAY OF FREE THYROXINE: CPT

## 2024-07-15 PROCEDURE — 71045 X-RAY EXAM CHEST 1 VIEW: CPT

## 2024-07-15 PROCEDURE — 80164 ASSAY DIPROPYLACETIC ACD TOT: CPT

## 2024-07-15 PROCEDURE — 85379 FIBRIN DEGRADATION QUANT: CPT

## 2024-07-15 PROCEDURE — 93005 ELECTROCARDIOGRAM TRACING: CPT | Performed by: EMERGENCY MEDICINE

## 2024-07-15 PROCEDURE — 96374 THER/PROPH/DIAG INJ IV PUSH: CPT

## 2024-07-15 PROCEDURE — 84484 ASSAY OF TROPONIN QUANT: CPT

## 2024-07-15 RX ORDER — DILTIAZEM HYDROCHLORIDE 5 MG/ML
10 INJECTION INTRAVENOUS
Status: COMPLETED | OUTPATIENT
Start: 2024-07-15 | End: 2024-07-15

## 2024-07-15 RX ORDER — 0.9 % SODIUM CHLORIDE 0.9 %
1000 INTRAVENOUS SOLUTION INTRAVENOUS
Status: COMPLETED | OUTPATIENT
Start: 2024-07-15 | End: 2024-07-16

## 2024-07-15 RX ADMIN — SODIUM CHLORIDE 1000 ML: 9 INJECTION, SOLUTION INTRAVENOUS at 22:28

## 2024-07-15 RX ADMIN — DILTIAZEM HYDROCHLORIDE 10 MG: 5 INJECTION, SOLUTION INTRAVENOUS at 21:58

## 2024-07-15 ASSESSMENT — ENCOUNTER SYMPTOMS
SHORTNESS OF BREATH: 1
COUGH: 0
CHOKING: 0
WHEEZING: 0
CHEST TIGHTNESS: 0
STRIDOR: 0

## 2024-07-15 ASSESSMENT — PAIN DESCRIPTION - PAIN TYPE: TYPE: ACUTE PAIN

## 2024-07-15 ASSESSMENT — PAIN - FUNCTIONAL ASSESSMENT: PAIN_FUNCTIONAL_ASSESSMENT: 0-10

## 2024-07-15 ASSESSMENT — PAIN DESCRIPTION - ONSET: ONSET: SUDDEN

## 2024-07-15 ASSESSMENT — PAIN DESCRIPTION - FREQUENCY: FREQUENCY: INTERMITTENT

## 2024-07-15 ASSESSMENT — PAIN SCALES - GENERAL: PAINLEVEL_OUTOF10: 4

## 2024-07-16 VITALS
HEART RATE: 85 BPM | TEMPERATURE: 97.6 F | OXYGEN SATURATION: 95 % | WEIGHT: 200 LBS | DIASTOLIC BLOOD PRESSURE: 79 MMHG | SYSTOLIC BLOOD PRESSURE: 104 MMHG | HEIGHT: 63 IN | BODY MASS INDEX: 35.44 KG/M2 | RESPIRATION RATE: 16 BRPM

## 2024-07-16 LAB
EKG ATRIAL RATE: 115 BPM
EKG ATRIAL RATE: 150 BPM
EKG DIAGNOSIS: NORMAL
EKG DIAGNOSIS: NORMAL
EKG P AXIS: 33 DEGREES
EKG P AXIS: 68 DEGREES
EKG P-R INTERVAL: 132 MS
EKG P-R INTERVAL: 88 MS
EKG Q-T INTERVAL: 284 MS
EKG Q-T INTERVAL: 344 MS
EKG QRS DURATION: 88 MS
EKG QRS DURATION: 88 MS
EKG QTC CALCULATION (BAZETT): 393 MS
EKG QTC CALCULATION (BAZETT): 543 MS
EKG R AXIS: 68 DEGREES
EKG R AXIS: 71 DEGREES
EKG T AXIS: 238 DEGREES
EKG T AXIS: 46 DEGREES
EKG VENTRICULAR RATE: 115 BPM
EKG VENTRICULAR RATE: 150 BPM
TROPONIN T SERPL HS-MCNC: <6 NG/L (ref 0–14)
VALPROATE SERPL-MCNC: 50 UG/ML (ref 50–100)

## 2024-07-16 PROCEDURE — 93010 ELECTROCARDIOGRAM REPORT: CPT | Performed by: INTERNAL MEDICINE

## 2024-07-16 NOTE — ED PROVIDER NOTES
Emergency Department Provider Note       PCP: Aki Story DO   Age: 44 y.o.   Sex: female     DISPOSITION Decision To Discharge 07/16/2024 12:20:34 AM       ICD-10-CM    1. Sinus tachycardia  R00.0       2. Elevated TSH  R79.89           Medical Decision Making     Patient had clinical and symptomatic movement in the emergency department with the resolution of the tachycardia.  There was a mild hypomagnesemia and mild elevation of the TSH is the only significant findings on testing.  Cardiac enzymes negative x 2.  D-dimer was negative.  Etiology of the tachycardia may be related to thyroid however free T4 was normal.  After Cardizem it also appears that the rhythm was sinus tachycardia and not in atrial flutter.  Will refer to primary care for follow-up.  Patient is stable for discharge.     1 acute complicated illness  Shared medical decision making was utilized in creating the patients health plan today.    I independently ordered and reviewed each unique test.       I interpreted the EKG performed at 2133 probable atrial flutter with 2-1 conduction with a heart rate of 150 bpm.  Diffuse ST depressions noted, no STEMI..    Repeat EKG obtained at 22 10:19 milligrams of Cardizem shows a sinus tachycardia with a rate of 115 bpm.  No acute ischemic changes.              History     Patient presents to the emergency department with complaint of onset of shortness of breath about an hour and a half ago.  She states that she was getting ready for bed and lying in bed when the symptoms began acutely.  She denies any chest pain, lightheadedness but did note that her heart was beating faster than normal.  She denies any known history of cardiac disease or arrhythmia.  She has had no recent medication changes.  She was recently treated for colitis and had a colonoscopy performed about 10 days ago.  She states that she was diagnosed with IBS following that colonoscopy.  She denies any fever or chills or cough and review of

## 2024-07-16 NOTE — ED TRIAGE NOTES
Pt arrives A&Ox4 ambulatory. Pt reports chest pain w/SOB starting approx 1 hr ago. No other s/sx. No cardiac hx.